# Patient Record
Sex: MALE | Race: WHITE | NOT HISPANIC OR LATINO | Employment: FULL TIME | URBAN - METROPOLITAN AREA
[De-identification: names, ages, dates, MRNs, and addresses within clinical notes are randomized per-mention and may not be internally consistent; named-entity substitution may affect disease eponyms.]

---

## 2022-02-21 ENCOUNTER — HISTORICAL (OUTPATIENT)
Dept: ADMINISTRATIVE | Facility: HOSPITAL | Age: 27
End: 2022-02-21

## 2022-02-21 LAB — POC CREATININE: 0.8 (ref 0.6–1.3)

## 2022-03-04 ENCOUNTER — HISTORICAL (OUTPATIENT)
Dept: ADMINISTRATIVE | Facility: HOSPITAL | Age: 27
End: 2022-03-04

## 2022-03-07 ENCOUNTER — HISTORICAL (OUTPATIENT)
Dept: CARDIOLOGY | Facility: HOSPITAL | Age: 27
End: 2022-03-07

## 2022-04-06 ENCOUNTER — HISTORICAL (OUTPATIENT)
Dept: ADMINISTRATIVE | Facility: HOSPITAL | Age: 27
End: 2022-04-06

## 2022-04-06 LAB
ABS NEUT (OLG): 2.2 (ref 2.1–9.2)
ALBUMIN SERPL-MCNC: 4.2 G/DL (ref 3.5–5)
ALBUMIN/GLOB SERPL: 1.4 {RATIO} (ref 1.1–2)
ALP SERPL-CCNC: 66 U/L (ref 40–150)
ALT SERPL-CCNC: 40 U/L (ref 0–55)
APPEARANCE, UA: CLEAR
APTT PPP: 27.7 S (ref 23.2–33.7)
AST SERPL-CCNC: 28 U/L (ref 5–34)
BACTERIA SPEC CULT: NORMAL
BASOPHILS # BLD AUTO: 0 10*3/UL (ref 0–0.2)
BASOPHILS NFR BLD AUTO: 1 %
BILIRUB SERPL-MCNC: 0.7 MG/DL
BILIRUB UR QL STRIP: NEGATIVE
BILIRUBIN DIRECT+TOT PNL SERPL-MCNC: 0.3 (ref 0–0.5)
BILIRUBIN DIRECT+TOT PNL SERPL-MCNC: 0.4 (ref 0–0.8)
BUN SERPL-MCNC: 28.4 MG/DL (ref 8.9–20.6)
CALCIUM SERPL-MCNC: 9.4 MG/DL (ref 8.7–10.5)
CHLORIDE SERPL-SCNC: 105 MMOL/L (ref 98–107)
CO2 SERPL-SCNC: 28 MMOL/L (ref 22–29)
COLOR UR: YELLOW
CREAT SERPL-MCNC: 0.97 MG/DL (ref 0.73–1.18)
EOSINOPHIL # BLD AUTO: 0.2 10*3/UL (ref 0–0.9)
EOSINOPHIL NFR BLD AUTO: 6 %
ERYTHROCYTE [DISTWIDTH] IN BLOOD BY AUTOMATED COUNT: 13.1 % (ref 11.5–17)
GLOBULIN SER-MCNC: 2.9 G/DL (ref 2.4–3.5)
GLUCOSE (UA): NEGATIVE
GLUCOSE SERPL-MCNC: 63 MG/DL (ref 74–100)
HCT VFR BLD AUTO: 45.4 % (ref 42–52)
HEMOLYSIS INTERF INDEX SERPL-ACNC: 2
HGB BLD-MCNC: 15 G/DL (ref 14–18)
HGB UR QL STRIP: NEGATIVE
ICTERIC INTERF INDEX SERPL-ACNC: 1
INR PPP: 1 (ref 0–1.3)
KETONES UR QL STRIP: NEGATIVE
LEUKOCYTE ESTERASE UR QL STRIP: NEGATIVE
LIPEMIC INTERF INDEX SERPL-ACNC: 2
LYMPHOCYTES # BLD AUTO: 1.6 10*3/UL (ref 0.6–4.6)
LYMPHOCYTES NFR BLD AUTO: 35 %
MANUAL DIFF? (OHS): NO
MCH RBC QN AUTO: 29.6 PG (ref 27–31)
MCHC RBC AUTO-ENTMCNC: 33 G/DL (ref 33–36)
MCV RBC AUTO: 89.5 FL (ref 80–94)
MONOCYTES # BLD AUTO: 0.4 10*3/UL (ref 0.1–1.3)
MONOCYTES NFR BLD AUTO: 8 %
NEUTROPHILS # BLD AUTO: 2.2 10*3/UL (ref 2.1–9.2)
NEUTROPHILS NFR BLD AUTO: 50 %
NITRITE UR QL STRIP: NEGATIVE
PH UR STRIP: 6.5 [PH] (ref 5–9)
PLATELET # BLD AUTO: 146 10*3/UL (ref 130–400)
PMV BLD AUTO: 10.2 FL (ref 9.4–12.4)
POTASSIUM SERPL-SCNC: 4.2 MMOL/L (ref 3.5–5.1)
PROT SERPL-MCNC: 7.1 G/DL (ref 6.4–8.3)
PROT UR QL STRIP: NEGATIVE
PROTHROMBIN TIME: 13.4 S (ref 12.5–14.5)
RBC # BLD AUTO: 5.07 10*6/UL (ref 4.7–6.1)
RBC #/AREA URNS HPF: NORMAL /[HPF] (ref 0–2)
SARS-COV-2 AG RESP QL IA.RAPID: NEGATIVE
SODIUM SERPL-SCNC: 141 MMOL/L (ref 136–145)
SP GR UR STRIP: 1.02 (ref 1–1.03)
SQUAMOUS EPITHELIAL, UA: NORMAL (ref 0–4)
UA WBC MAN: NORMAL (ref 0–2)
UROBILINOGEN UR STRIP-ACNC: 0.2
WBC # SPEC AUTO: 4.4 10*3/UL (ref 4.5–11.5)

## 2022-04-07 ENCOUNTER — HISTORICAL (OUTPATIENT)
Dept: ADMINISTRATIVE | Facility: HOSPITAL | Age: 27
End: 2022-04-07

## 2022-04-13 ENCOUNTER — HISTORICAL (OUTPATIENT)
Dept: ADMINISTRATIVE | Facility: HOSPITAL | Age: 27
End: 2022-04-13

## 2022-04-13 LAB
INR PPP: 1.9 (ref 0–1.3)
PROTHROMBIN TIME: 21.1 S (ref 12.5–14.5)

## 2022-04-28 ENCOUNTER — HISTORICAL (OUTPATIENT)
Dept: LAB | Facility: HOSPITAL | Age: 27
End: 2022-04-28
Payer: COMMERCIAL

## 2022-04-28 LAB
INR PPP: 1.8 (ref 2–3)
PROTHROMBIN TIME: 20.4 S (ref 11.7–14.5)

## 2022-05-03 ENCOUNTER — OFFICE VISIT (OUTPATIENT)
Dept: CARDIAC SURGERY | Facility: CLINIC | Age: 27
End: 2022-05-03
Payer: COMMERCIAL

## 2022-05-03 VITALS — SYSTOLIC BLOOD PRESSURE: 129 MMHG | DIASTOLIC BLOOD PRESSURE: 79 MMHG | HEART RATE: 91 BPM

## 2022-05-03 DIAGNOSIS — Z95.4 STATUS POST AORTIC VALVE REPLACEMENT WITH METALLIC VALVE: Primary | ICD-10-CM

## 2022-05-03 PROCEDURE — 99024 POSTOP FOLLOW-UP VISIT: CPT | Mod: ,,, | Performed by: SPECIALIST

## 2022-05-03 PROCEDURE — 1159F PR MEDICATION LIST DOCUMENTED IN MEDICAL RECORD: ICD-10-PCS | Mod: CPTII,,, | Performed by: SPECIALIST

## 2022-05-03 PROCEDURE — 3074F SYST BP LT 130 MM HG: CPT | Mod: CPTII,,, | Performed by: SPECIALIST

## 2022-05-03 PROCEDURE — 3078F PR MOST RECENT DIASTOLIC BLOOD PRESSURE < 80 MM HG: ICD-10-PCS | Mod: CPTII,,, | Performed by: SPECIALIST

## 2022-05-03 PROCEDURE — 1159F MED LIST DOCD IN RCRD: CPT | Mod: CPTII,,, | Performed by: SPECIALIST

## 2022-05-03 PROCEDURE — 1160F RVW MEDS BY RX/DR IN RCRD: CPT | Mod: CPTII,,, | Performed by: SPECIALIST

## 2022-05-03 PROCEDURE — 99024 PR POST-OP FOLLOW-UP VISIT: ICD-10-PCS | Mod: ,,, | Performed by: SPECIALIST

## 2022-05-03 PROCEDURE — 3074F PR MOST RECENT SYSTOLIC BLOOD PRESSURE < 130 MM HG: ICD-10-PCS | Mod: CPTII,,, | Performed by: SPECIALIST

## 2022-05-03 PROCEDURE — 3078F DIAST BP <80 MM HG: CPT | Mod: CPTII,,, | Performed by: SPECIALIST

## 2022-05-03 PROCEDURE — 1160F PR REVIEW ALL MEDS BY PRESCRIBER/CLIN PHARMACIST DOCUMENTED: ICD-10-PCS | Mod: CPTII,,, | Performed by: SPECIALIST

## 2022-05-03 RX ORDER — METHYLPREDNISOLONE 32 MG/1
TABLET ORAL
COMMUNITY
Start: 2022-02-16 | End: 2022-05-20

## 2022-05-03 RX ORDER — WARFARIN SODIUM 5 MG/1
5 TABLET ORAL
COMMUNITY
Start: 2022-04-11 | End: 2022-05-20

## 2022-05-03 RX ORDER — FAMOTIDINE 40 MG/1
TABLET, FILM COATED ORAL
COMMUNITY
Start: 2022-02-16

## 2022-05-03 RX ORDER — ENOXAPARIN SODIUM 100 MG/ML
INJECTION SUBCUTANEOUS
COMMUNITY
Start: 2022-04-11 | End: 2022-05-03

## 2022-05-03 RX ORDER — OXYCODONE AND ACETAMINOPHEN 5; 325 MG/1; MG/1
1 TABLET ORAL EVERY 4 HOURS PRN
COMMUNITY
Start: 2022-04-11

## 2022-05-03 RX ORDER — KETOROLAC TROMETHAMINE 10 MG/1
TABLET, FILM COATED ORAL
COMMUNITY
Start: 2022-04-11 | End: 2022-05-20

## 2022-05-03 NOTE — PROGRESS NOTES
Patient returns about a month following minimally invasive aortic valve replacement  He was doing very well with minimal pain until about a week ago he suddenly developed some right-sided chest wall fairly sharp pain.  He has little difficulty with deep breaths.  It is better today.  It responds well to his Toradol.  On physical examination his lungs are clear, his heart has a regular rate and rhythm without murmurs or gallops  His wounds have healed nicely.  There is a slight movement over his 2nd rib.  He has no swelling or edema and there is no murmur  His last INR is 1.8 and his medication has been adjusted by the [facility]  We will continue with minimal physical activity other than walking is not to do any lifting  I will see him back in 4 weeks just to be sure the chest wall is healing satisfactorily

## 2022-05-05 ENCOUNTER — LAB VISIT (OUTPATIENT)
Dept: LAB | Facility: HOSPITAL | Age: 27
End: 2022-05-05
Attending: INTERNAL MEDICINE
Payer: COMMERCIAL

## 2022-05-05 DIAGNOSIS — Z95.2 HEART VALVE REPLACED: Primary | ICD-10-CM

## 2022-05-05 LAB
INR BLD: 1.96 (ref 2–3)
PROTHROMBIN TIME: 21.8 SECONDS (ref 11.7–14.5)

## 2022-05-05 PROCEDURE — 36415 COLL VENOUS BLD VENIPUNCTURE: CPT

## 2022-05-05 PROCEDURE — 85610 PROTHROMBIN TIME: CPT

## 2022-05-12 ENCOUNTER — LAB VISIT (OUTPATIENT)
Dept: LAB | Facility: HOSPITAL | Age: 27
End: 2022-05-12
Attending: INTERNAL MEDICINE
Payer: COMMERCIAL

## 2022-05-12 ENCOUNTER — HOSPITAL ENCOUNTER (OUTPATIENT)
Dept: RADIOLOGY | Facility: HOSPITAL | Age: 27
Discharge: HOME | End: 2022-05-12
Attending: SPECIALIST
Payer: COMMERCIAL

## 2022-05-12 DIAGNOSIS — I47.9 PT (PAROXYSMAL TACHYCARDIA): Primary | ICD-10-CM

## 2022-05-12 DIAGNOSIS — R07.89 FEELING OF CHEST TIGHTNESS: Primary | ICD-10-CM

## 2022-05-12 DIAGNOSIS — R07.89 FEELING OF CHEST TIGHTNESS: ICD-10-CM

## 2022-05-12 LAB
INR BLD: 1.82 (ref 2–3)
PROTHROMBIN TIME: 20.6 SECONDS (ref 11.7–14.5)

## 2022-05-12 PROCEDURE — 36415 COLL VENOUS BLD VENIPUNCTURE: CPT

## 2022-05-12 PROCEDURE — 85610 PROTHROMBIN TIME: CPT

## 2022-05-12 PROCEDURE — 71046 X-RAY EXAM CHEST 2 VIEWS: CPT | Mod: TC

## 2022-05-13 RX ORDER — FUROSEMIDE 20 MG/1
20 TABLET ORAL 2 TIMES DAILY
Qty: 60 TABLET | Refills: 11 | Status: CANCELLED | OUTPATIENT
Start: 2022-05-13 | End: 2023-05-13

## 2022-05-13 RX ORDER — POTASSIUM CHLORIDE 750 MG/1
20 TABLET, EXTENDED RELEASE ORAL 2 TIMES DAILY
Qty: 20 TABLET | Refills: 0 | Status: CANCELLED | OUTPATIENT
Start: 2022-05-13 | End: 2022-05-18

## 2022-05-17 ENCOUNTER — TELEPHONE (OUTPATIENT)
Dept: CARDIAC SURGERY | Facility: CLINIC | Age: 27
End: 2022-05-17
Payer: COMMERCIAL

## 2022-05-17 DIAGNOSIS — R07.89 FEELING OF CHEST TIGHTNESS: Primary | ICD-10-CM

## 2022-05-18 ENCOUNTER — HOSPITAL ENCOUNTER (OUTPATIENT)
Dept: RADIOLOGY | Facility: HOSPITAL | Age: 27
Discharge: HOME | DRG: 314 | End: 2022-05-18
Attending: PHYSICIAN ASSISTANT
Payer: COMMERCIAL

## 2022-05-18 DIAGNOSIS — R07.89 FEELING OF CHEST TIGHTNESS: Primary | ICD-10-CM

## 2022-05-18 DIAGNOSIS — R07.89 FEELING OF CHEST TIGHTNESS: ICD-10-CM

## 2022-05-18 DIAGNOSIS — J90 PLEURAL EFFUSION, NOT ELSEWHERE CLASSIFIED: ICD-10-CM

## 2022-05-18 PROCEDURE — 71046 X-RAY EXAM CHEST 2 VIEWS: CPT | Mod: TC

## 2022-05-18 RX ORDER — AMOXICILLIN AND CLAVULANATE POTASSIUM 875; 125 MG/1; MG/1
1 TABLET, FILM COATED ORAL 2 TIMES DAILY
Qty: 10 TABLET | Refills: 0 | Status: ON HOLD | OUTPATIENT
Start: 2022-05-18 | End: 2022-05-23

## 2022-05-18 RX ORDER — AMOXICILLIN AND CLAVULANATE POTASSIUM 875; 125 MG/1; MG/1
1 TABLET, FILM COATED ORAL EVERY 12 HOURS
Qty: 10 TABLET | Refills: 0 | Status: CANCELLED | OUTPATIENT
Start: 2022-05-18 | End: 2022-05-23

## 2022-05-19 DIAGNOSIS — R07.89 FEELING OF CHEST TIGHTNESS: Primary | ICD-10-CM

## 2022-05-20 ENCOUNTER — HOSPITAL ENCOUNTER (INPATIENT)
Facility: HOSPITAL | Age: 27
LOS: 3 days | Discharge: HOME | DRG: 314 | End: 2022-05-23
Attending: STUDENT IN AN ORGANIZED HEALTH CARE EDUCATION/TRAINING PROGRAM | Admitting: SPECIALIST
Payer: COMMERCIAL

## 2022-05-20 DIAGNOSIS — R05.9 COUGH: ICD-10-CM

## 2022-05-20 DIAGNOSIS — I31.39 PERICARDIAL EFFUSION: ICD-10-CM

## 2022-05-20 DIAGNOSIS — U07.1 COVID-19: Primary | ICD-10-CM

## 2022-05-20 DIAGNOSIS — I31.4 CARDIAC TAMPONADE: ICD-10-CM

## 2022-05-20 DIAGNOSIS — I35.8 AORTIC VALVE ENDOCARDITIS: ICD-10-CM

## 2022-05-20 DIAGNOSIS — R06.00 DYSPNEA, UNSPECIFIED TYPE: ICD-10-CM

## 2022-05-20 DIAGNOSIS — R06.02 SOB (SHORTNESS OF BREATH): ICD-10-CM

## 2022-05-20 LAB
ALBUMIN SERPL-MCNC: 3.4 GM/DL (ref 3.5–5)
ALBUMIN/GLOB SERPL: 0.9 RATIO (ref 1.1–2)
ALP SERPL-CCNC: 52 UNIT/L (ref 40–150)
ALT SERPL-CCNC: 19 UNIT/L (ref 0–55)
APTT PPP: 55.8 SECONDS (ref 23.2–33.7)
AST SERPL-CCNC: 21 UNIT/L (ref 5–34)
AV INDEX (PROSTH): 0.34
AV MEAN GRADIENT: 15 MMHG
AV PEAK GRADIENT: 26 MMHG
AV VALVE AREA: 1.08 CM2
AV VELOCITY RATIO: 0.32
BASOPHILS # BLD AUTO: 0.04 X10(3)/MCL (ref 0–0.2)
BASOPHILS NFR BLD AUTO: 0.8 %
BILIRUBIN DIRECT+TOT PNL SERPL-MCNC: 0.4 MG/DL
BNP BLD-MCNC: 71.7 PG/ML
BSA FOR ECHO PROCEDURE: 1.91 M2
BUN SERPL-MCNC: 18.4 MG/DL (ref 8.9–20.6)
CALCIUM SERPL-MCNC: 9.4 MG/DL (ref 8.4–10.2)
CHLORIDE SERPL-SCNC: 105 MMOL/L (ref 98–107)
CO2 SERPL-SCNC: 29 MMOL/L (ref 22–29)
CREAT SERPL-MCNC: 0.83 MG/DL (ref 0.73–1.18)
CRP SERPL-MCNC: 63.3 MG/L
CV ECHO LV RWT: 0.82 CM
DOP CALC AO PEAK VEL: 2.53 M/S
DOP CALC AO VTI: 40 CM
DOP CALC LVOT AREA: 3.1 CM2
DOP CALC LVOT DIAMETER: 2 CM
DOP CALC LVOT PEAK VEL: 0.81 M/S
DOP CALC LVOT STROKE VOLUME: 43.02 CM3
DOP CALC MV VTI: 19 CM
DOP CALCLVOT PEAK VEL VTI: 13.7 CM
E/A RATIO: 1
E/E' RATIO: 6.42 M/S
ECHO LV POSTERIOR WALL: 1.69 CM (ref 0.6–1.1)
EJECTION FRACTION: 67 %
EOSINOPHIL # BLD AUTO: 0.65 X10(3)/MCL (ref 0–0.9)
EOSINOPHIL NFR BLD AUTO: 12.5 %
ERYTHROCYTE [DISTWIDTH] IN BLOOD BY AUTOMATED COUNT: 13.1 % (ref 11.5–17)
FLUAV AG UPPER RESP QL IA.RAPID: NOT DETECTED
FLUBV AG UPPER RESP QL IA.RAPID: NOT DETECTED
FRACTIONAL SHORTENING: 28 % (ref 28–44)
GLOBULIN SER-MCNC: 3.6 GM/DL (ref 2.4–3.5)
GLUCOSE SERPL-MCNC: 103 MG/DL (ref 74–100)
HCT VFR BLD AUTO: 33.5 % (ref 42–52)
HGB BLD-MCNC: 10.6 GM/DL (ref 14–18)
IMM GRANULOCYTES # BLD AUTO: 0.02 X10(3)/MCL (ref 0–0.02)
IMM GRANULOCYTES NFR BLD AUTO: 0.4 % (ref 0–0.43)
INR BLD: 2.5 (ref 0–1.3)
INTERVENTRICULAR SEPTUM: 1.49 CM (ref 0.6–1.1)
LEFT ATRIUM SIZE: 3.5 CM
LEFT ATRIUM VOLUME INDEX MOD: 18.4 ML/M2
LEFT ATRIUM VOLUME MOD: 34.6 CM3
LEFT INTERNAL DIMENSION IN SYSTOLE: 2.94 CM (ref 2.1–4)
LEFT VENTRICLE DIASTOLIC VOLUME INDEX: 39.48 ML/M2
LEFT VENTRICLE DIASTOLIC VOLUME: 74.22 ML
LEFT VENTRICLE MASS INDEX: 141 G/M2
LEFT VENTRICLE SYSTOLIC VOLUME INDEX: 17.7 ML/M2
LEFT VENTRICLE SYSTOLIC VOLUME: 33.31 ML
LEFT VENTRICULAR INTERNAL DIMENSION IN DIASTOLE: 4.1 CM (ref 3.5–6)
LEFT VENTRICULAR MASS: 264.27 G
LV LATERAL E/E' RATIO: 5.55 M/S
LV SEPTAL E/E' RATIO: 7.63 M/S
LVOT MG: 2 MMHG
LVOT MV: 0.57 CM/S
LYMPHOCYTES # BLD AUTO: 0.88 X10(3)/MCL (ref 0.6–4.6)
LYMPHOCYTES NFR BLD AUTO: 16.9 %
MCH RBC QN AUTO: 25.7 PG (ref 27–31)
MCHC RBC AUTO-ENTMCNC: 31.6 MG/DL (ref 33–36)
MCV RBC AUTO: 81.3 FL (ref 80–94)
MONOCYTES # BLD AUTO: 0.55 X10(3)/MCL (ref 0.1–1.3)
MONOCYTES NFR BLD AUTO: 10.6 %
MV MEAN GRADIENT: 2 MMHG
MV PEAK A VEL: 0.61 M/S
MV PEAK E VEL: 0.61 M/S
MV PEAK GRADIENT: 4 MMHG
MV VALVE AREA BY CONTINUITY EQUATION: 2.26 CM2
NEUTROPHILS # BLD AUTO: 3.1 X10(3)/MCL (ref 2.1–9.2)
NEUTROPHILS NFR BLD AUTO: 58.8 %
NRBC BLD AUTO-RTO: 0 %
PISA TR MAX VEL: 1.5 M/S
PLATELET # BLD AUTO: 296 X10(3)/MCL (ref 130–400)
PMV BLD AUTO: 10.3 FL (ref 9.4–12.4)
POTASSIUM SERPL-SCNC: 5 MMOL/L (ref 3.5–5.1)
PROT SERPL-MCNC: 7 GM/DL (ref 6.4–8.3)
PROTHROMBIN TIME: 26.5 SECONDS (ref 12.5–14.5)
PV PEAK VELOCITY: 1.39 CM/S
RBC # BLD AUTO: 4.12 X10(6)/MCL (ref 4.7–6.1)
RIGHT VENTRICULAR END-DIASTOLIC DIMENSION: 2.51 CM
SARS-COV-2 RNA RESP QL NAA+PROBE: DETECTED
SODIUM SERPL-SCNC: 139 MMOL/L (ref 136–145)
TDI LATERAL: 0.11 M/S
TDI SEPTAL: 0.08 M/S
TDI: 0.1 M/S
TR MAX PG: 9 MMHG
TRICUSPID ANNULAR PLANE SYSTOLIC EXCURSION: 2.07 CM
TROPONIN I SERPL-MCNC: <0.01 NG/ML (ref 0–0.04)
WBC # SPEC AUTO: 5.2 X10(3)/MCL (ref 4.5–11.5)

## 2022-05-20 PROCEDURE — 99024 POSTOP FOLLOW-UP VISIT: CPT | Mod: ,,, | Performed by: PHYSICIAN ASSISTANT

## 2022-05-20 PROCEDURE — 93010 EKG 12-LEAD: ICD-10-PCS | Mod: ,,, | Performed by: INTERNAL MEDICINE

## 2022-05-20 PROCEDURE — 25000003 PHARM REV CODE 250: Performed by: INTERNAL MEDICINE

## 2022-05-20 PROCEDURE — 85730 THROMBOPLASTIN TIME PARTIAL: CPT | Performed by: STUDENT IN AN ORGANIZED HEALTH CARE EDUCATION/TRAINING PROGRAM

## 2022-05-20 PROCEDURE — 25000003 PHARM REV CODE 250: Performed by: PHYSICIAN ASSISTANT

## 2022-05-20 PROCEDURE — 93010 ELECTROCARDIOGRAM REPORT: CPT | Mod: ,,, | Performed by: INTERNAL MEDICINE

## 2022-05-20 PROCEDURE — 27000207 HC ISOLATION

## 2022-05-20 PROCEDURE — 99285 EMERGENCY DEPT VISIT HI MDM: CPT | Mod: 25

## 2022-05-20 PROCEDURE — 84484 ASSAY OF TROPONIN QUANT: CPT | Performed by: STUDENT IN AN ORGANIZED HEALTH CARE EDUCATION/TRAINING PROGRAM

## 2022-05-20 PROCEDURE — 93005 ELECTROCARDIOGRAM TRACING: CPT

## 2022-05-20 PROCEDURE — 36415 COLL VENOUS BLD VENIPUNCTURE: CPT | Performed by: STUDENT IN AN ORGANIZED HEALTH CARE EDUCATION/TRAINING PROGRAM

## 2022-05-20 PROCEDURE — 85610 PROTHROMBIN TIME: CPT | Performed by: STUDENT IN AN ORGANIZED HEALTH CARE EDUCATION/TRAINING PROGRAM

## 2022-05-20 PROCEDURE — 83880 ASSAY OF NATRIURETIC PEPTIDE: CPT | Performed by: STUDENT IN AN ORGANIZED HEALTH CARE EDUCATION/TRAINING PROGRAM

## 2022-05-20 PROCEDURE — 80053 COMPREHEN METABOLIC PANEL: CPT | Performed by: STUDENT IN AN ORGANIZED HEALTH CARE EDUCATION/TRAINING PROGRAM

## 2022-05-20 PROCEDURE — 11000001 HC ACUTE MED/SURG PRIVATE ROOM

## 2022-05-20 PROCEDURE — 85025 COMPLETE CBC W/AUTO DIFF WBC: CPT | Performed by: STUDENT IN AN ORGANIZED HEALTH CARE EDUCATION/TRAINING PROGRAM

## 2022-05-20 PROCEDURE — 87636 SARSCOV2 & INF A&B AMP PRB: CPT | Performed by: EMERGENCY MEDICINE

## 2022-05-20 PROCEDURE — 86140 C-REACTIVE PROTEIN: CPT | Performed by: NURSE PRACTITIONER

## 2022-05-20 PROCEDURE — 99024 PR POST-OP FOLLOW-UP VISIT: ICD-10-PCS | Mod: ,,, | Performed by: PHYSICIAN ASSISTANT

## 2022-05-20 RX ORDER — ASPIRIN 325 MG
650 TABLET, DELAYED RELEASE (ENTERIC COATED) ORAL 3 TIMES DAILY
Status: DISCONTINUED | OUTPATIENT
Start: 2022-05-20 | End: 2022-05-23 | Stop reason: HOSPADM

## 2022-05-20 RX ORDER — BENZONATATE 100 MG/1
100 CAPSULE ORAL 3 TIMES DAILY PRN
Qty: 20 CAPSULE | Refills: 0 | Status: SHIPPED | OUTPATIENT
Start: 2022-05-20 | End: 2022-05-30

## 2022-05-20 RX ORDER — POTASSIUM CHLORIDE 20 MEQ/1
20 TABLET, EXTENDED RELEASE ORAL 2 TIMES DAILY
Status: DISCONTINUED | OUTPATIENT
Start: 2022-05-20 | End: 2022-05-20

## 2022-05-20 RX ORDER — METHYLPREDNISOLONE SOD SUCC 125 MG
125 VIAL (EA) INJECTION ONCE
Status: DISCONTINUED | OUTPATIENT
Start: 2022-05-20 | End: 2022-05-20

## 2022-05-20 RX ORDER — FAMOTIDINE 10 MG/ML
20 INJECTION INTRAVENOUS ONCE
Status: DISCONTINUED | OUTPATIENT
Start: 2022-05-20 | End: 2022-05-20

## 2022-05-20 RX ORDER — LORATADINE 10 MG/1
10 TABLET ORAL DAILY
Qty: 30 TABLET | Refills: 0 | Status: SHIPPED | OUTPATIENT
Start: 2022-05-20 | End: 2022-06-19

## 2022-05-20 RX ORDER — DIPHENHYDRAMINE HYDROCHLORIDE 50 MG/ML
25 INJECTION INTRAMUSCULAR; INTRAVENOUS ONCE
Status: DISCONTINUED | OUTPATIENT
Start: 2022-05-20 | End: 2022-05-20

## 2022-05-20 RX ORDER — METHYLPREDNISOLONE SOD SUCC 125 MG
VIAL (EA) INJECTION
Status: DISPENSED
Start: 2022-05-20 | End: 2022-05-21

## 2022-05-20 RX ORDER — FAMOTIDINE 20 MG/1
20 TABLET, FILM COATED ORAL DAILY
Status: DISCONTINUED | OUTPATIENT
Start: 2022-05-20 | End: 2022-05-20

## 2022-05-20 RX ORDER — FLUTICASONE PROPIONATE 50 MCG
1 SPRAY, SUSPENSION (ML) NASAL 2 TIMES DAILY PRN
Qty: 15 G | Refills: 0 | Status: SHIPPED | OUTPATIENT
Start: 2022-05-20

## 2022-05-20 RX ORDER — WARFARIN 10 MG/1
5 TABLET ORAL
COMMUNITY
Start: 2022-04-11 | End: 2022-05-20

## 2022-05-20 RX ORDER — OXYCODONE AND ACETAMINOPHEN 5; 325 MG/1; MG/1
1 TABLET ORAL EVERY 4 HOURS PRN
Status: DISCONTINUED | OUTPATIENT
Start: 2022-05-20 | End: 2022-05-23 | Stop reason: HOSPADM

## 2022-05-20 RX ORDER — COLCHICINE 0.6 MG/1
0.6 TABLET, FILM COATED ORAL 2 TIMES DAILY
Status: DISCONTINUED | OUTPATIENT
Start: 2022-05-20 | End: 2022-05-23 | Stop reason: HOSPADM

## 2022-05-20 RX ORDER — FAMOTIDINE 20 MG/1
20 TABLET, FILM COATED ORAL DAILY
Status: DISCONTINUED | OUTPATIENT
Start: 2022-05-21 | End: 2022-05-23 | Stop reason: HOSPADM

## 2022-05-20 RX ORDER — AMOXICILLIN AND CLAVULANATE POTASSIUM 875; 125 MG/1; MG/1
1 TABLET, FILM COATED ORAL 2 TIMES DAILY
Status: DISCONTINUED | OUTPATIENT
Start: 2022-05-20 | End: 2022-05-23 | Stop reason: HOSPADM

## 2022-05-20 RX ORDER — POTASSIUM CHLORIDE 20 MEQ/1
20 TABLET, EXTENDED RELEASE ORAL 2 TIMES DAILY
Status: ON HOLD | COMMUNITY
Start: 2022-05-13 | End: 2022-05-23 | Stop reason: HOSPADM

## 2022-05-20 RX ADMIN — ASPIRIN 650 MG: 325 TABLET, COATED ORAL at 03:05

## 2022-05-20 RX ADMIN — COLCHICINE 0.6 MG: 0.6 TABLET, FILM COATED ORAL at 08:05

## 2022-05-20 RX ADMIN — ASPIRIN 650 MG: 325 TABLET, COATED ORAL at 08:05

## 2022-05-20 RX ADMIN — AMOXICILLIN AND CLAVULANATE POTASSIUM 1 TABLET: 875; 125 TABLET, FILM COATED ORAL at 08:05

## 2022-05-20 RX ADMIN — AMOXICILLIN AND CLAVULANATE POTASSIUM 1 TABLET: 875; 125 TABLET, FILM COATED ORAL at 02:05

## 2022-05-20 NOTE — PROGRESS NOTES
No fever last night  Wbc normal  cxr improved  covid pos  Will get TTE      As above, temp down, although after abx's  COVID pos. Likely source of temp  If TTE shows no evidence of vegetation, and blood cultures neg., OK to discharge pt  Will cont. PO abx's for a week

## 2022-05-20 NOTE — ED NOTES
"Pt to ED with complaints of SOB, cough, fever x5 days; pt reports Hx of valve replacement, sees Dr Reece. Placed on ABx x3 days ago. Max temp at home 102.5 F, took tylenol last night. Pt reports had Chest XR, blood work done earlier in the week, given "fluid pills;" last dose Monday, prior to fever starting. Pt aaox4, nad, CRISTINA; pt and mother updated on plan of care, verbalizes understanding   "

## 2022-05-20 NOTE — CONSULTS
Consults   [facility] - Emergency Dept  Cardiology  Consult Note    Patient Name: Dani Pettit  MRN: [id number]  Admission Date: 5/20/2022  Hospital Length of Stay: 0 days  Code Status: No Order   Attending Provider: Fabricio Mustafa MD   Consulting Provider: DIPIKA Howell  Primary Care Physician: Maribel Mireles MD  Principal Problem:<principal problem not specified>    Patient information was obtained from patient and ER records.     Subjective:     Chief Complaint: Consultation Reason: Cardiac Tamponade     HPI:  Mr. Pettit is a 26 year old male, known to Dr. Hastings, who presented to the ED with reports of fever, and intermittent chest pain over the last week. Patient has history of bicuspid Aortic Valve and underwent MINI AVR with Mechanical valve on 4.7.22. He is on Warfarin Outpatient with INR noted to be within therapeutic range (2.5). Valve type is noted to be 22 mm Medtronic Mechanical Valve. Blood cultures were drawn and are negative for growth at 24 Hours. He was found to be COVID-19 positive, therefore, TTE was opted for over TOM. Transthoracic echocardiogram today (5.20.22) revealed large pericardial effusion with tamponade physiology. He has been evaluated by CTS (Dr. Reece) who is requesting pericardial draining of the effusion. [facility] is consulted for this reason.    PMH: Syncope/Collapse, VHD- Bicuspid Aortic Valve Requiring Mechanical AVR (April 2022)  PSH: Minimally Invasive Aortic Valve Replacement with #22 Medtronic Mechanical Valve (4.7.22), Wound reexploration Surgery Post Valve Replacement with Cautery of Pectoralis Muscular Bleeder)  Family History: No Significant Family History is Noted  Social History: Tobacco- Former Smoker, ETOH- Negative, Substance Abuse- Negative    Previous Cardiac Diagnostics:   Echocardiogram (5.20.22):  Large circumferential pericardial effusion with possible early tamponade.  The left ventricle is normal in size with mild concentric  hypertrophy and normal systolic function.  The estimated ejection fraction is 67%.  Well seated mechanical AV without significant stenosis or perivalvular leak. Peak AV 2.53 m/sec, MG 15 mmHg.  Normal left ventricular diastolic function.  Normal right ventricular size with normal right ventricular systolic function.     TOM (4.7.22):  Grade II Atherosclerotic Plaques in the Arch and Mid Thoracic Aorta.  GITA with no Thrombus and no Evidence of ASD.  Normal RV Size/Function.  No Significant MR or MS Noted.  Bicuspid Aortic Valve with a Folded and Distorted Anterior Leaflet.  LVH with EF 50% without Focal Segmental WMA.  No Pericardial Effusion is Noted.    Review of patient's allergies indicates:   Allergen Reactions    Egg derived     Shellfish containing products      Review of Systems:  Review of Systems   Constitutional: Positive for fatigue and fever.   Respiratory: Negative for shortness of breath.    Cardiovascular: Positive for chest pain.   All other systems reviewed and are negative.      Objective:     Vital Signs (Most Recent):  Temp: 97.7 °F (36.5 °C) (05/20/22 0543)  Pulse: 71 (05/20/22 0858)  Resp: 16 (05/20/22 0605)  BP: 139/82 (05/20/22 0858)  SpO2: 98 % (05/20/22 0858) Vital Signs (24h Range):  Temp:  [97.7 °F (36.5 °C)] 97.7 °F (36.5 °C)  Pulse:  [] 71  Resp:  [16-18] 16  SpO2:  [96 %-98 %] 98 %  BP: (117-143)/(65-82) 139/82     Weight: 77 kg (169 lb 12.1 oz)  Body mass index is 26.64 kg/m².    SpO2: 98 %  O2 Device (Oxygen Therapy): room air    No intake or output data in the 24 hours ending 05/20/22 1251    Lines/Drains/Airways     None               Significant Labs:  Recent Results (from the past 72 hour(s))   CBC with Differential    Collection Time: 05/18/22  7:52 AM   Result Value Ref Range    WBC 6.2 4.5 - 11.5 x10(3)/mcL    RBC 4.71 4.70 - 6.10 x10(6)/mcL    Hgb 12.1 (L) 14.0 - 18.0 gm/dL    Hct 38.7 (L) 42.0 - 52.0 %    MCV 82.2 80.0 - 94.0 fL    MCH 25.7 (L) 27.0 - 31.0 pg    MCHC  31.3 (L) 33.0 - 36.0 mg/dL    RDW 13.0 11.5 - 17.0 %    Platelet 330 130 - 400 x10(3)/mcL    MPV 10.1 9.4 - 12.4 fL    IG# 0.01 0 - 0.0155 x10(3)/mcL    IG% 0.2 0 - 0.43 %    NRBC% 0.0 %   Manual Differential    Collection Time: 05/18/22  7:52 AM   Result Value Ref Range    Neut Man 58 %    Lymph Man 12 %    Monocyte Man 16 %    Eos Man 11 %    Basophil Man 3 %    Band Neutrophil Man 0 %    [place] Man 0 %    Myelo Man 0 %    Promyelo Man 0 %    Blasts Man 0 %    Plasmacyte Man 0 %    Prolymph Man 0 %    Instr WBC 6 x10(3)/mcL    Abs Mono Man 0.96 0.1 - 1.3 x10(3)/mcL    Abs Eos Man 0.66 0 - 0.9 x10(3)/mcL    Abs Baso Man 0.18 0 - 0.2 x10(3)/mcL    Abs Lymp Man 0.72 (L) 0.6 - 4.6 x10(3)/mcL    Abs Neut 3.48 2.1 - 9.2 x10(3)/mcL    NRBC Man 0 %    Platelet Est Adequate Adequate    Polychrom 1+ (A) (none)    RBC Morph Abnormal (A) Normal    Anisocyte 1+ (A) (none)    Macrocyte 1+ (A) (none)    Acanthocytes 1+ (A) (none)    Stomatocytes 1+ (A) (none)   Basic Metabolic Panel    Collection Time: 05/18/22 11:50 AM   Result Value Ref Range    Sodium Level 138 136 - 145 mmol/L    Potassium Level 4.5 3.5 - 5.1 mmol/L    Chloride 102 98 - 107 mmol/L    Carbon Dioxide 28 22 - 29 mmol/L    Glucose Level 93 74 - 100 mg/dL    Blood Urea Nitrogen 18.1 8.9 - 20.6 mg/dL    Creatinine 0.87 0.73 - 1.18 mg/dL    BUN/Creatinine Ratio 21     Calcium Level Total 8.6 8.4 - 10.2 mg/dL    Anion Gap 8.0 mEq/L   Urine culture    Collection Time: 05/18/22 11:50 AM    Specimen: Urine, Clean Catch   Result Value Ref Range    Urine Culture No Growth At 24 Hours    Blood Culture [facility]    Collection Time: 05/18/22 11:50 AM    Specimen: Blood   Result Value Ref Range    CULTURE, BLOOD ([facility]) No Growth At 24 Hours    Protime-INR    Collection Time: 05/19/22  8:32 AM   Result Value Ref Range    PT 23.8 (H) 11.7 - 14.5 seconds    INR 2.19 2.00 - 3.00   COVID/FLU A&B PCR    Collection Time: 05/20/22  5:45 AM   Result Value Ref Range    Influenza A PCR Not  Detected Not Detected    Influenza B PCR Not Detected Not Detected    SARS-CoV-2 PCR Detected (A) Not Detected   Comprehensive metabolic panel    Collection Time: 05/20/22  6:32 AM   Result Value Ref Range    Sodium Level 139 136 - 145 mmol/L    Potassium Level 5.0 3.5 - 5.1 mmol/L    Chloride 105 98 - 107 mmol/L    Carbon Dioxide 29 22 - 29 mmol/L    Glucose Level 103 (H) 74 - 100 mg/dL    Blood Urea Nitrogen 18.4 8.9 - 20.6 mg/dL    Creatinine 0.83 0.73 - 1.18 mg/dL    Calcium Level Total 9.4 8.4 - 10.2 mg/dL    Protein Total 7.0 6.4 - 8.3 gm/dL    Albumin Level 3.4 (L) 3.5 - 5.0 gm/dL    Globulin 3.6 (H) 2.4 - 3.5 gm/dL    Albumin/Globulin Ratio 0.9 (L) 1.1 - 2.0 ratio    Bilirubin Total 0.4 <=1.5 mg/dL    Alkaline Phosphatase 52 40 - 150 unit/L    Alanine Aminotransferase 19 0 - 55 unit/L    Aspartate Aminotransferase 21 5 - 34 unit/L    Estimated GFR-Non  >60 mls/min/1.73/m2   APTT    Collection Time: 05/20/22  6:32 AM   Result Value Ref Range    PTT 55.8 (H) 23.2 - 33.7 seconds   Brain natriuretic peptide    Collection Time: 05/20/22  6:32 AM   Result Value Ref Range    Natriuretic Peptide 71.7 <=100.0 pg/mL   Troponin I    Collection Time: 05/20/22  6:32 AM   Result Value Ref Range    Troponin-I <0.010 0.000 - 0.045 ng/mL   CBC with Differential    Collection Time: 05/20/22  6:32 AM   Result Value Ref Range    WBC 5.2 4.5 - 11.5 x10(3)/mcL    RBC 4.12 (L) 4.70 - 6.10 x10(6)/mcL    Hgb 10.6 (L) 14.0 - 18.0 gm/dL    Hct 33.5 (L) 42.0 - 52.0 %    MCV 81.3 80.0 - 94.0 fL    MCH 25.7 (L) 27.0 - 31.0 pg    MCHC 31.6 (L) 33.0 - 36.0 mg/dL    RDW 13.1 11.5 - 17.0 %    Platelet 296 130 - 400 x10(3)/mcL    MPV 10.3 9.4 - 12.4 fL    Neut % 58.8 %    Lymph % 16.9 %    Mono % 10.6 %    Eos % 12.5 %    Basophil % 0.8 %    Lymph # 0.88 0.6 - 4.6 x10(3)/mcL    Neut # 3.1 2.1 - 9.2 x10(3)/mcL    Mono # 0.55 0.1 - 1.3 x10(3)/mcL    Eos # 0.65 0 - 0.9 x10(3)/mcL    Baso # 0.04 0 - 0.2 x10(3)/mcL    IG# 0.02 (H)  0 - 0.0155 x10(3)/mcL    IG% 0.4 0 - 0.43 %    NRBC% 0.0 %   Protime-INR    Collection Time: 05/20/22  6:32 AM   Result Value Ref Range    PT 26.5 (H) 12.5 - 14.5 seconds    INR 2.50 (H) 0.00 - 1.30   Echo    Collection Time: 05/20/22 12:36 PM   Result Value Ref Range    BSA 1.91 m2    TDI SEPTAL 0.08 m/s    LV LATERAL E/E' RATIO 5.55 m/s    LV SEPTAL E/E' RATIO 7.63 m/s    Left Ventricular Outflow Tract Mean Velocity 0.569 cm/s    Left Ventricular Outflow Tract Mean Gradient 2.00 mmHg    TDI LATERAL 0.11 m/s    PV PEAK VELOCITY 1.39 cm/s    LVIDd 4.10 3.5 - 6.0 cm    IVS 1.49 (A) 0.6 - 1.1 cm    Posterior Wall 1.69 (A) 0.6 - 1.1 cm    LVIDs 2.94 2.1 - 4.0 cm    FS 28 28 - 44 %    LV mass 264.27 g    LA size 3.50 cm    RVDD 2.51 cm    TAPSE 2.07 cm    Left Ventricle Relative Wall Thickness 0.82 cm    AV mean gradient 15 mmHg    AV valve area 1.08 cm2    AV Velocity Ratio 0.32     AV index (prosthetic) 0.34     MV mean gradient 2 mmHg    MV valve area by continuity eq 2.26 cm2    E/A ratio 1.00     Mean e' 0.10 m/s    LVOT diameter 2.00 cm    LVOT area 3.1 cm2    LVOT peak oren 0.81 m/s    LVOT peak VTI 13.70 cm    Ao peak oren 2.53 m/s    Ao VTI 40.0 cm    LVOT stroke volume 43.02 cm3    AV peak gradient 26 mmHg    MV peak gradient 4 mmHg    E/E' ratio 6.42 m/s    MV Peak E Oren 0.61 m/s    TR Max Oren 1.50 m/s    MV VTI 19.0 cm    MV Peak A Oren 0.61 m/s    LV Systolic Volume 33.31 mL    LV Systolic Volume Index 17.7 mL/m2    LV Diastolic Volume 74.22 mL    LV Diastolic Volume Index 39.48 mL/m2    LV Mass Index 141 g/m2    Triscuspid Valve Regurgitation Peak Gradient 9 mmHg    LA Volume Index (Mod) 18.4 mL/m2    LA volume (mod) 34.60 cm3       Significant Imaging: X-Ray: CXR: X-Ray Chest PA and Lateral (CXR): No results found for this visit on 05/20/22.  Imaging Results          X-Ray Chest AP Portable (Final result)  Result time 05/20/22 08:02:57    Final result by Hemanth Styles MD (05/20/22 08:02:57)                  Impression:      Similar small right pleural effusion versus right pleural scarring.  No acute pulmonary process appreciated.      Electronically signed by: Hemanth Jensen  Date:    05/20/2022  Time:    08:02             Narrative:    EXAMINATION:  XR CHEST AP PORTABLE    CLINICAL HISTORY:  Shortness of breath    TECHNIQUE:  Frontal view(s) of the chest.    COMPARISON:  Radiography 05/18/2022    FINDINGS:  Normal cardiac silhouette.  The lungs are well-inflated.  No consolidation identified.  Similar mild blunting of the right costophrenic angle.  No pneumothorax.                              Telemetry: Sinus Rhythm 96 BPM    EKG:    EKG:      Physical Exam  HENT:      Head: Normocephalic.   Cardiovascular:      Rate and Rhythm: Normal rate and regular rhythm.      Comments: Pericardial Friction Rub. Crisp Aortic Click [facility] at [place]  Pulmonary:      Effort: Pulmonary effort is normal.      Breath sounds: Normal breath sounds.   Abdominal:      General: Abdomen is flat.      Palpations: Abdomen is soft.   Musculoskeletal:      Cervical back: Neck supple.   Skin:     General: Skin is warm and dry.   Neurological:      General: No focal deficit present.      Mental Status: He is oriented to person, place, and time.   Psychiatric:         Mood and Affect: Mood normal.         Behavior: Behavior normal.       Home Medications:   No current facility-administered medications on file prior to encounter.     Current Outpatient Medications on File Prior to Encounter   Medication Sig Dispense Refill    amoxicillin-clavulanate 875-125mg (AUGMENTIN) 875-125 mg per tablet Take 1 tablet by mouth 2 (two) times a day. 10 tablet 0    potassium chloride SA (K-DUR,KLOR-CON) 20 MEQ tablet Take 20 mEq by mouth 2 (two) times daily.      warfarin (COUMADIN) 10 MG tablet Take 5 mg by mouth.      warfarin (COUMADIN) 5 MG tablet Take 5 mg by mouth.      famotidine (PEPCID) 40 MG tablet TAKE 1 TABLET BY MOUTH 30 MINUTES BEFORE SCAN       ketorolac (TORADOL) 10 mg tablet TAKE 1 TABLET BY MOUTH EVERY four HOURS AS NEEDED FOR PAIN. maximum of 4 tablets per day      methylPREDNISolone (MEDROL) 32 MG tablet TAKE 1 TABLET BY MOUTH AT 12 HOURS AND 2 HOURS PRIOR TO CT SCAN      oxyCODONE-acetaminophen (PERCOCET) 5-325 mg per tablet Take 1 tablet by mouth every 4 (four) hours as needed. for pain.         Current Inpatient Medications:  No current facility-administered medications for this encounter.    Current Outpatient Medications:     amoxicillin-clavulanate 875-125mg (AUGMENTIN) 875-125 mg per tablet, Take 1 tablet by mouth 2 (two) times a day., Disp: 10 tablet, Rfl: 0    potassium chloride SA (K-DUR,KLOR-CON) 20 MEQ tablet, Take 20 mEq by mouth 2 (two) times daily., Disp: , Rfl:     warfarin (COUMADIN) 10 MG tablet, Take 5 mg by mouth., Disp: , Rfl:     warfarin (COUMADIN) 5 MG tablet, Take 5 mg by mouth., Disp: , Rfl:     benzonatate (TESSALON) 100 MG capsule, Take 1 capsule (100 mg total) by mouth 3 (three) times daily as needed for Cough., Disp: 20 capsule, Rfl: 0    famotidine (PEPCID) 40 MG tablet, TAKE 1 TABLET BY MOUTH 30 MINUTES BEFORE SCAN, Disp: , Rfl:     fluticasone propionate (FLONASE) 50 mcg/actuation nasal spray, 1 spray (50 mcg total) by Each Nostril route 2 (two) times daily as needed for Rhinitis., Disp: 15 g, Rfl: 0    ketorolac (TORADOL) 10 mg tablet, TAKE 1 TABLET BY MOUTH EVERY four HOURS AS NEEDED FOR PAIN. maximum of 4 tablets per day, Disp: , Rfl:     loratadine (CLARITIN) 10 mg tablet, Take 1 tablet (10 mg total) by mouth once daily., Disp: 30 tablet, Rfl: 0    methylPREDNISolone (MEDROL) 32 MG tablet, TAKE 1 TABLET BY MOUTH AT 12 HOURS AND 2 HOURS PRIOR TO CT SCAN, Disp: , Rfl:     oxyCODONE-acetaminophen (PERCOCET) 5-325 mg per tablet, Take 1 tablet by mouth every 4 (four) hours as needed. for pain., Disp: , Rfl:          VTE Risk Mitigation (From admission, onward)    None          Assessment:   Large  "circumferential pericardial effusion with possible early tamponade Physiology    - Hemodynamically Stable at Current time  VHD- (Bicuspid Aortic Valve) S/P #22 Mechanical Medtronic Aortic Valve Replacement (4.7.22)    - On Warfarin Outpatient with Therapeutic INR (2.5)    - Well seated mechanical AV without significant stenosis or perivalvular leak. Peak AV 2.53 m/sec, MG 15 mmHg (Echo 5.20.22)    - EF 67%  Acute COVID-19 Infection (Asymptomatic)    - Blood Cultures Negative at 24 Hours  Small Right Pleural Effusion Versus Scarring on Chest Xray (5.20.22)  Anemia  Reported Shellfish Allergy Re: "Hives"    Plan:   CTS requested urgent pericardiocentesis in the setting of pericardial effusion with tamponade physciology.  Discussed with interventional cardiology (Dr. Bravo), who recommend deferring pericardiocentesis at this time given INR 2.5, non NPO Status, and current hemodynamic stability.  Hold Warfarin for now. Will monitor and consider percutaneous intervention versus Surgical Window when INR is within safe range  Check CRP.  Given fever, chest pain, + friction rub and pericardial effusion, I will treat for pericarditis s/p cardiac surgery.   Plan for colchicine 0.6 bid, with 650 ASA TID  Continuous Tele Monitoring while in Hospital  Will follow closely    Thank you for your consult.      Pb Tyson MD  Cardiology    "

## 2022-05-20 NOTE — ED PROVIDER NOTES
Encounter Date: 5/20/2022       History     Chief Complaint   Patient presents with    Fever    Shortness of Breath     Complaint of fever, productive cough, with SOB.  Symptoms started Monday.        Shortness of Breath  This is a new problem. The current episode started more than 2 days ago. The problem has been gradually improving. Associated symptoms include a fever and cough. Pertinent negatives include no sore throat, no chest pain, no abdominal pain and no rash. He has tried nothing for the symptoms. He has had prior hospitalizations. He has had no prior ED visits.        Patient is a 26-year-old male with past medical history of aortic valve stenosis status post aortic valve repair (Dr. Reece of cardiothoracic surgeon) presents to the emergency department for 1 week of shortness of breath, fever, productive cough.  No mitigating factors reported.  Patient had outpatient lab work, blood cultures obtained.  Patient state he had outpatient x-ray which showed fluid, he was started on diuretic.  He is scheduled for TOM later today.  No other symptoms reported.      Review of patient's allergies indicates:   Allergen Reactions    Egg derived     Shellfish containing products      Past Medical History:   Diagnosis Date    Stenosis of aortic and mitral valves      No past surgical history on file.  Family History   Family history unknown: Yes     Social History     Tobacco Use    Smoking status: Former Smoker    Smokeless tobacco: Never Used     Review of Systems   Constitutional: Positive for fever.   HENT: Negative for sore throat.    Eyes: Negative for visual disturbance.   Respiratory: Positive for cough and shortness of breath.    Cardiovascular: Negative for chest pain.   Gastrointestinal: Negative for abdominal pain.   Genitourinary: Negative for dysuria.   Musculoskeletal: Negative for joint swelling.   Skin: Negative for rash.   Neurological: Negative for weakness.   Psychiatric/Behavioral:  Negative for confusion.   All other systems reviewed and are negative.      Physical Exam     Initial Vitals [05/20/22 0543]   BP Pulse Resp Temp SpO2   (!) 140/71 98 18 97.7 °F (36.5 °C) 98 %      MAP       --         Physical Exam    Nursing note and vitals reviewed.  Constitutional: He appears well-developed and well-nourished. He is not diaphoretic. No distress.   HENT:   Head: Normocephalic and atraumatic.   Eyes: Conjunctivae and EOM are normal. Pupils are equal, round, and reactive to light.   Neck:   Normal range of motion.  Cardiovascular: Normal rate, regular rhythm and intact distal pulses.   Murmur (mechanical click) heard.  Pulmonary/Chest: Breath sounds normal. No respiratory distress. He has no wheezes. He has no rales.   Diminished BS R lung base.    Surgical incision sites chest wall, clean dry intact.  No surrounding redness or drainage.   Abdominal: Abdomen is soft. He exhibits no distension. There is no abdominal tenderness.   Musculoskeletal:         General: No tenderness or edema. Normal range of motion.      Cervical back: Normal range of motion.     Neurological: He is alert and oriented to person, place, and time. No cranial nerve deficit.   Skin: Skin is warm and dry. Capillary refill takes less than 2 seconds. No rash noted. No erythema.   Psychiatric: He has a normal mood and affect.         ED Course   Procedures  Labs Reviewed   COVID/FLU A&B PCR - Abnormal; Notable for the following components:       Result Value    SARS-CoV-2 PCR Detected (*)     All other components within normal limits   COMPREHENSIVE METABOLIC PANEL - Abnormal; Notable for the following components:    Glucose Level 103 (*)     Albumin Level 3.4 (*)     Globulin 3.6 (*)     Albumin/Globulin Ratio 0.9 (*)     All other components within normal limits   APTT - Abnormal; Notable for the following components:    PTT 55.8 (*)     All other components within normal limits   CBC WITH DIFFERENTIAL - Abnormal; Notable for  the following components:    RBC 4.12 (*)     Hgb 10.6 (*)     Hct 33.5 (*)     MCH 25.7 (*)     MCHC 31.6 (*)     IG# 0.02 (*)     All other components within normal limits   PROTIME-INR - Abnormal; Notable for the following components:    PT 26.5 (*)     INR 2.50 (*)     All other components within normal limits   B-TYPE NATRIURETIC PEPTIDE - Normal   TROPONIN I - Normal   CBC W/ AUTO DIFFERENTIAL    Narrative:     The following orders were created for panel order CBC auto differential.  Procedure                               Abnormality         Status                     ---------                               -----------         ------                     CBC with Differential[[id number]]        Abnormal            Final result                 Please view results for these tests on the individual orders.   URINALYSIS     EKG Readings: (Independently Interpreted)   Initial Reading: No STEMI.   Normal sinus rhythm.  Rate 98. Normal intervals.  No STEMI.     ECG Results          EKG 12-lead (In process)  Result time 05/20/22 06:35:13    In process by Interface, Lab In [facility] (05/20/22 06:35:13)                 Narrative:    Test Reason : R06.02,    Vent. Rate : 098 BPM     Atrial Rate : 098 BPM     P-R Int : 132 ms          QRS Dur : 094 ms      QT Int : 354 ms       P-R-T Axes : 055 048 042 degrees     QTc Int : 451 ms    Normal sinus rhythm  Nonspecific ST and T wave abnormality  Abnormal ECG  No previous ECGs available    Referred By: [email or web address]   SELF           Confirmed By:                   In process by Interface, Lab In [facility] (05/20/22 06:34:49)                 Narrative:    Test Reason : R06.02,    Vent. Rate : 098 BPM     Atrial Rate : 098 BPM     P-R Int : 132 ms          QRS Dur : 094 ms      QT Int : 354 ms       P-R-T Axes : 055 048 042 degrees     QTc Int : 451 ms    Normal sinus rhythm  Nonspecific ST and T wave abnormality  Abnormal ECG  No previous ECGs available    Referred By: [email or web address]   SELF            Confirmed By:                             Imaging Results          X-Ray Chest AP Portable (Final result)  Result time 05/20/22 08:02:57    Final result by Hemanth Styles MD (05/20/22 08:02:57)                 Impression:      Similar small right pleural effusion versus right pleural scarring.  No acute pulmonary process appreciated.      Electronically signed by: Hemanth Styles  Date:    05/20/2022  Time:    08:02             Narrative:    EXAMINATION:  XR CHEST AP PORTABLE    CLINICAL HISTORY:  Shortness of breath    TECHNIQUE:  Frontal view(s) of the chest.    COMPARISON:  Radiography 05/18/2022    FINDINGS:  Normal cardiac silhouette.  The lungs are well-inflated.  No consolidation identified.  Similar mild blunting of the right costophrenic angle.  No pneumothorax.                                 Medications - No data to display  Medical Decision Making:   Clinical Tests:   Lab Tests: Ordered and Reviewed  Radiological Study: Reviewed and Ordered  Medical Tests: Ordered and Reviewed    Patient is a 26-year-old male who presents to the ED for shortness of breath, cough, fever x3 days.  He is currently undergoing evaluation for possible etiologies of his shortness of breath and fever.  He had blood work, blood cultures, x-rays done outpatient.  Repeat lab work done today, no leukocytosis, swab positive for COVID-19.  Discussed with vascular surgery.  Discussed with Cardiology.  He has a rosa scheduled later today.  Currently placed in observation status.  All results discussed with patient and family.  He has no questions at time.  Patient remain NPO for any event he has possible procedure later today.  Patient and family verbalized understanding agreed to plan.             ED Course as of 05/27/22 2250   Fri May 20, 2022   0810 Discussed with Dr. Reece's team.  Patient scheduled for ROSA today.   [RP]   0818 Discussed with Gabe with [facility].  Will see patient at 10:00AM for possible ROSA. [RP]   5335  Discussed with Dr. Reece.  Will obtain TTE.  If nml, can DC.  [RP]      ED Course User Index  [RP] Fabricio Mustafa MD             Clinical Impression:   Final diagnoses:  [R06.02] SOB (shortness of breath)  [R06.00] Dyspnea, unspecified type (Primary)  [R05.9] Cough  [U07.1] COVID-19          ED Disposition Condition    Observation               Fabricio Mustafa MD  05/20/22 7484       Fabricio Mustafa MD  05/27/22 5789

## 2022-05-20 NOTE — ED PROVIDER NOTES
Encounter Date: 5/20/2022       History     Chief Complaint   Patient presents with    Fever    Shortness of Breath     Complaint of fever, productive cough, with SOB.  Symptoms started Monday.      HPI  Review of patient's allergies indicates:   Allergen Reactions    Egg derived     Shellfish containing products      Past Medical History:   Diagnosis Date    Stenosis of aortic and mitral valves      No past surgical history on file.  Family History   Family history unknown: Yes     Social History     Tobacco Use    Smoking status: Former Smoker    Smokeless tobacco: Never Used     Review of Systems    Physical Exam     Initial Vitals [05/20/22 0543]   BP Pulse Resp Temp SpO2   (!) 140/71 98 18 97.7 °F (36.5 °C) 98 %      MAP       --         Physical Exam    ED Course   Procedures  Labs Reviewed   COVID/FLU A&B PCR - Abnormal; Notable for the following components:       Result Value    SARS-CoV-2 PCR Detected (*)     All other components within normal limits   COMPREHENSIVE METABOLIC PANEL - Abnormal; Notable for the following components:    Glucose Level 103 (*)     Albumin Level 3.4 (*)     Globulin 3.6 (*)     Albumin/Globulin Ratio 0.9 (*)     All other components within normal limits   APTT - Abnormal; Notable for the following components:    PTT 55.8 (*)     All other components within normal limits   CBC WITH DIFFERENTIAL - Abnormal; Notable for the following components:    RBC 4.12 (*)     Hgb 10.6 (*)     Hct 33.5 (*)     MCH 25.7 (*)     MCHC 31.6 (*)     IG# 0.02 (*)     All other components within normal limits   PROTIME-INR - Abnormal; Notable for the following components:    PT 26.5 (*)     INR 2.50 (*)     All other components within normal limits   B-TYPE NATRIURETIC PEPTIDE - Normal   TROPONIN I - Normal   CBC W/ AUTO DIFFERENTIAL    Narrative:     The following orders were created for panel order CBC auto differential.  Procedure                               Abnormality         Status                      ---------                               -----------         ------                     CBC with Differential[[id number]]        Abnormal            Final result                 Please view results for these tests on the individual orders.   URINALYSIS        ECG Results          EKG 12-lead (In process)  Result time 05/20/22 06:35:13    In process by Interface, Lab In [facility] (05/20/22 06:35:13)                 Narrative:    Test Reason : R06.02,    Vent. Rate : 098 BPM     Atrial Rate : 098 BPM     P-R Int : 132 ms          QRS Dur : 094 ms      QT Int : 354 ms       P-R-T Axes : 055 048 042 degrees     QTc Int : 451 ms    Normal sinus rhythm  Nonspecific ST and T wave abnormality  Abnormal ECG  No previous ECGs available    Referred By: [email or web address]   SELF           Confirmed By:                   In process by Interface, Lab In [facility] (05/20/22 06:34:49)                 Narrative:    Test Reason : R06.02,    Vent. Rate : 098 BPM     Atrial Rate : 098 BPM     P-R Int : 132 ms          QRS Dur : 094 ms      QT Int : 354 ms       P-R-T Axes : 055 048 042 degrees     QTc Int : 451 ms    Normal sinus rhythm  Nonspecific ST and T wave abnormality  Abnormal ECG  No previous ECGs available    Referred By: [email or web address]   SELF           Confirmed By:                             Imaging Results          X-Ray Chest AP Portable (Final result)  Result time 05/20/22 08:02:57    Final result by Hemanth Styles MD (05/20/22 08:02:57)                 Impression:      Similar small right pleural effusion versus right pleural scarring.  No acute pulmonary process appreciated.      Electronically signed by: Hemanth Styles  Date:    05/20/2022  Time:    08:02             Narrative:    EXAMINATION:  XR CHEST AP PORTABLE    CLINICAL HISTORY:  Shortness of breath    TECHNIQUE:  Frontal view(s) of the chest.    COMPARISON:  Radiography 05/18/2022    FINDINGS:  Normal cardiac silhouette.  The lungs are well-inflated.  No  consolidation identified.  Similar mild blunting of the right costophrenic angle.  No pneumothorax.                                 Medications - No data to display  Medical Decision Making:   ED Management:  Dr. Reece informed of echo results - asked that [facility] could be paged to drain the fluid collection [facility] paged 124pm - RT with [facility] saw pt and will admit               ED Course as of 05/20/22 1323   Fri May 20, 2022   0810 Discussed with Dr. Reece's team.  Patient scheduled for TOM today.   [RP]   0818 Discussed with Gabe with [facility].  Will see patient at 10:00AM for possible TOM. [RP]   1042 Discussed with Dr. Reece.  Will obtain TTE.  If nml, can DC.  [RP]      ED Course User Index  [RP] Fabricio Mustafa MD             Clinical Impression:   Final diagnoses:  [R06.02] SOB (shortness of breath)  [R06.00] Dyspnea, unspecified type  [R05.9] Cough  [U07.1] COVID-19 (Primary)          ED Disposition Condition    Observation               Fernando Elliott MD  05/29/22 0258

## 2022-05-21 LAB
ANION GAP SERPL CALC-SCNC: 8 MEQ/L
BASOPHILS # BLD AUTO: 0.01 X10(3)/MCL (ref 0–0.2)
BASOPHILS NFR BLD AUTO: 0.1 %
BUN SERPL-MCNC: 19.2 MG/DL (ref 8.9–20.6)
CALCIUM SERPL-MCNC: 9.1 MG/DL (ref 8.4–10.2)
CHLORIDE SERPL-SCNC: 105 MMOL/L (ref 98–107)
CO2 SERPL-SCNC: 26 MMOL/L (ref 22–29)
CREAT SERPL-MCNC: 0.78 MG/DL (ref 0.73–1.18)
CREAT/UREA NIT SERPL: 25
EOSINOPHIL # BLD AUTO: 0 X10(3)/MCL (ref 0–0.9)
EOSINOPHIL NFR BLD AUTO: 0 %
ERYTHROCYTE [DISTWIDTH] IN BLOOD BY AUTOMATED COUNT: 13 % (ref 11.5–17)
GLUCOSE SERPL-MCNC: 179 MG/DL (ref 74–100)
HCT VFR BLD AUTO: 33.8 % (ref 42–52)
HGB BLD-MCNC: 10.5 GM/DL (ref 14–18)
IMM GRANULOCYTES # BLD AUTO: 0.03 X10(3)/MCL (ref 0–0.02)
IMM GRANULOCYTES NFR BLD AUTO: 0.3 % (ref 0–0.43)
INR BLD: 2.6 (ref 0–1.3)
LYMPHOCYTES # BLD AUTO: 0.72 X10(3)/MCL (ref 0.6–4.6)
LYMPHOCYTES NFR BLD AUTO: 8.2 %
MCH RBC QN AUTO: 25.5 PG (ref 27–31)
MCHC RBC AUTO-ENTMCNC: 31.1 MG/DL (ref 33–36)
MCV RBC AUTO: 82 FL (ref 80–94)
MONOCYTES # BLD AUTO: 0.64 X10(3)/MCL (ref 0.1–1.3)
MONOCYTES NFR BLD AUTO: 7.3 %
NEUTROPHILS # BLD AUTO: 7.4 X10(3)/MCL (ref 2.1–9.2)
NEUTROPHILS NFR BLD AUTO: 84.1 %
NRBC BLD AUTO-RTO: 0 %
PLATELET # BLD AUTO: 334 X10(3)/MCL (ref 130–400)
PMV BLD AUTO: 10.6 FL (ref 9.4–12.4)
POTASSIUM SERPL-SCNC: 4.7 MMOL/L (ref 3.5–5.1)
PROTHROMBIN TIME: 27.4 SECONDS (ref 12.5–14.5)
RBC # BLD AUTO: 4.12 X10(6)/MCL (ref 4.7–6.1)
SODIUM SERPL-SCNC: 139 MMOL/L (ref 136–145)
WBC # SPEC AUTO: 8.8 X10(3)/MCL (ref 4.5–11.5)

## 2022-05-21 PROCEDURE — 99024 PR POST-OP FOLLOW-UP VISIT: ICD-10-PCS | Mod: ,,, | Performed by: PHYSICIAN ASSISTANT

## 2022-05-21 PROCEDURE — 36415 COLL VENOUS BLD VENIPUNCTURE: CPT | Performed by: NURSE PRACTITIONER

## 2022-05-21 PROCEDURE — 11000001 HC ACUTE MED/SURG PRIVATE ROOM

## 2022-05-21 PROCEDURE — 94761 N-INVAS EAR/PLS OXIMETRY MLT: CPT

## 2022-05-21 PROCEDURE — 85610 PROTHROMBIN TIME: CPT | Performed by: NURSE PRACTITIONER

## 2022-05-21 PROCEDURE — 25000003 PHARM REV CODE 250: Performed by: PHYSICIAN ASSISTANT

## 2022-05-21 PROCEDURE — 80048 BASIC METABOLIC PNL TOTAL CA: CPT | Performed by: NURSE PRACTITIONER

## 2022-05-21 PROCEDURE — 25000003 PHARM REV CODE 250: Performed by: NURSE PRACTITIONER

## 2022-05-21 PROCEDURE — 85025 COMPLETE CBC W/AUTO DIFF WBC: CPT | Performed by: NURSE PRACTITIONER

## 2022-05-21 PROCEDURE — 27000207 HC ISOLATION

## 2022-05-21 PROCEDURE — 99024 POSTOP FOLLOW-UP VISIT: CPT | Mod: ,,, | Performed by: PHYSICIAN ASSISTANT

## 2022-05-21 PROCEDURE — 25000003 PHARM REV CODE 250: Performed by: INTERNAL MEDICINE

## 2022-05-21 RX ADMIN — AMOXICILLIN AND CLAVULANATE POTASSIUM 1 TABLET: 875; 125 TABLET, FILM COATED ORAL at 10:05

## 2022-05-21 RX ADMIN — FAMOTIDINE 20 MG: 20 TABLET, FILM COATED ORAL at 10:05

## 2022-05-21 RX ADMIN — ASPIRIN 650 MG: 325 TABLET, COATED ORAL at 03:05

## 2022-05-21 RX ADMIN — ASPIRIN 650 MG: 325 TABLET, COATED ORAL at 09:05

## 2022-05-21 RX ADMIN — AMOXICILLIN AND CLAVULANATE POTASSIUM 1 TABLET: 875; 125 TABLET, FILM COATED ORAL at 09:05

## 2022-05-21 RX ADMIN — COLCHICINE 0.6 MG: 0.6 TABLET, FILM COATED ORAL at 10:05

## 2022-05-21 RX ADMIN — ASPIRIN 650 MG: 325 TABLET, COATED ORAL at 10:05

## 2022-05-21 RX ADMIN — COLCHICINE 0.6 MG: 0.6 TABLET, FILM COATED ORAL at 09:05

## 2022-05-21 NOTE — PROGRESS NOTES
[facility]   Rounding Progress Note  Cardiothoracic Surgery    SUBJECTIVE:     Chief Complaint/Reason for Admission: fever    History of Present Illness:  Patient is a 26 y.o. male presents withfever  covid pos  Pericardial effusion      Family History of Heart Disease:no    No current facility-administered medications on file prior to encounter.     Current Outpatient Medications on File Prior to Encounter   Medication Sig Dispense Refill    amoxicillin-clavulanate 875-125mg (AUGMENTIN) 875-125 mg per tablet Take 1 tablet by mouth 2 (two) times a day. 10 tablet 0    potassium chloride SA (K-DUR,KLOR-CON) 20 MEQ tablet Take 20 mEq by mouth 2 (two) times daily.      [DISCONTINUED] warfarin (COUMADIN) 10 MG tablet Take 5 mg by mouth.      famotidine (PEPCID) 40 MG tablet TAKE 1 TABLET BY MOUTH 30 MINUTES BEFORE SCAN      oxyCODONE-acetaminophen (PERCOCET) 5-325 mg per tablet Take 1 tablet by mouth every 4 (four) hours as needed. for pain.          Review of patient's allergies indicates:   Allergen Reactions    Egg derived     Shellfish containing products         Past Medical History:   Diagnosis Date    Stenosis of aortic and mitral valves         No past surgical history on file.        Review of Systems:  Review of Systems   All other systems reviewed and are negative.       OBJECTIVE:        Physical Exam:  Physical Exam  Vitals reviewed.   HENT:      Head: Normocephalic.      Right Ear: Tympanic membrane normal.      Left Ear: Tympanic membrane normal.      Nose: Nose normal.      Mouth/Throat:      Mouth: Mucous membranes are moist.   Eyes:      Pupils: Pupils are equal, round, and reactive to light.   Cardiovascular:      Rate and Rhythm: Normal rate and regular rhythm.      Pulses: Normal pulses.   Pulmonary:      Effort: Pulmonary effort is normal.      Breath sounds: Normal breath sounds.   Abdominal:      Palpations: Abdomen is soft.   Musculoskeletal:         General: Normal range  of motion.      Cervical back: Normal range of motion.   Skin:     General: Skin is warm.   Neurological:      General: No focal deficit present.      Mental Status: He is alert.   Psychiatric:         Mood and Affect: Mood normal.          Laboratory:  I have reviewed all pertinent lab results within the past 24 hours.     INR 2.6    Diagnostic Results:  ECG: Reviewed          ASSESSMENT/PLAN:   A: pericardial effusion  P: await inr decline, poss perc drainage vs window poss monday

## 2022-05-21 NOTE — PROGRESS NOTES
Consults   [facility] - Emergency Dept  Cardiology  Consult Note    Patient Name: Dani Pettit  MRN: [id number]  Admission Date: 5/20/2022  Hospital Length of Stay: 1 days  Code Status: No Order   Attending Provider: Joni Reece IV, MD   Consulting Provider: DIPIKA Howell  Primary Care Physician: Maribel Mireles MD  Principal Problem:<principal problem not specified>    Patient information was obtained from patient and ER records.     Subjective:     Chief Complaint: Consultation Reason: Cardiac Tamponade     HPI:  Mr. Pettit is a 26 year old male, known to Dr. Hastings, who presented to the ED with reports of fever, and intermittent chest pain over the last week. Patient has history of bicuspid Aortic Valve and underwent MINI AVR with Mechanical valve on 4.7.22. He is on Warfarin Outpatient with INR noted to be within therapeutic range (2.5). Valve type is noted to be 22 mm Medtronic Mechanical Valve. Blood cultures were drawn and are negative for growth at 24 Hours. He was found to be COVID-19 positive, therefore, TTE was opted for over TOM. Transthoracic echocardiogram today (5.20.22) revealed large pericardial effusion with tamponade physiology. He has been evaluated by CTS (Dr. Reece) who is requesting pericardial draining of the effusion. CIS is consulted for this reason.    5.21.22: NAD Noted. Feeling fine. Denies CP/SOB. SR on Tele. BP Stable. INR 2.6. Warfarin on Hold.    PMH: Syncope/Collapse, VHD- Bicuspid Aortic Valve Requiring Mechanical AVR (April 2022)  PSH: Minimally Invasive Aortic Valve Replacement with #22 Medtronic Mechanical Valve (4.7.22), Wound reexploration Surgery Post Valve Replacement with Cautery of Pectoralis Muscular Bleeder)  Family History: No Significant Family History is Noted  Social History: Tobacco- Former Smoker, ETOH- Negative, Substance Abuse- Negative    Previous Cardiac Diagnostics:   Echocardiogram (5.20.22):  Large circumferential  pericardial effusion with possible early tamponade.  The left ventricle is normal in size with mild concentric hypertrophy and normal systolic function.  The estimated ejection fraction is 67%.  Well seated mechanical AV without significant stenosis or perivalvular leak. Peak AV 2.53 m/sec, MG 15 mmHg.  Normal left ventricular diastolic function.  Normal right ventricular size with normal right ventricular systolic function.     TOM (4.7.22):  Grade II Atherosclerotic Plaques in the Arch and Mid Thoracic Aorta.  GITA with no Thrombus and no Evidence of ASD.  Normal RV Size/Function.  No Significant MR or MS Noted.  Bicuspid Aortic Valve with a Folded and Distorted Anterior Leaflet.  LVH with EF 50% without Focal Segmental WMA.  No Pericardial Effusion is Noted.    Review of patient's allergies indicates:   Allergen Reactions    Egg derived     Shellfish containing products      Review of Systems:  Review of Systems   Constitutional: Negative for fatigue and fever.   Respiratory: Negative for shortness of breath.    Cardiovascular: Negative for chest pain.   All other systems reviewed and are negative.      Objective:     Vital Signs (Most Recent):  Temp: 97.9 °F (36.6 °C) (05/21/22 1114)  Pulse: 68 (05/21/22 1114)  Resp: (!) 21 (05/21/22 1114)  BP: 123/73 (05/21/22 1114)  SpO2: 98 % (05/21/22 1114) Vital Signs (24h Range):  Temp:  [97.6 °F (36.4 °C)-98.2 °F (36.8 °C)] 97.9 °F (36.6 °C)  Pulse:  [68-80] 68  Resp:  [19-21] 21  SpO2:  [93 %-98 %] 98 %  BP: (121-136)/(64-76) 123/73     Weight: 79.5 kg (175 lb 4.8 oz)  Body mass index is 27.51 kg/m².    SpO2: 98 %  O2 Device (Oxygen Therapy): room air    No intake or output data in the 24 hours ending 05/21/22 1117    Lines/Drains/Airways     Peripheral Intravenous Line  Duration                Peripheral IV - Single Lumen 05/20/22 1328 20 G Right Forearm <1 day              Significant Labs:  Recent Results (from the past 72 hour(s))   Basic Metabolic Panel     Collection Time: 05/18/22 11:50 AM   Result Value Ref Range    Sodium Level 138 136 - 145 mmol/L    Potassium Level 4.5 3.5 - 5.1 mmol/L    Chloride 102 98 - 107 mmol/L    Carbon Dioxide 28 22 - 29 mmol/L    Glucose Level 93 74 - 100 mg/dL    Blood Urea Nitrogen 18.1 8.9 - 20.6 mg/dL    Creatinine 0.87 0.73 - 1.18 mg/dL    BUN/Creatinine Ratio 21     Calcium Level Total 8.6 8.4 - 10.2 mg/dL    Anion Gap 8.0 mEq/L   Urine culture    Collection Time: 05/18/22 11:50 AM    Specimen: Urine, Clean Catch   Result Value Ref Range    Urine Culture No Growth    Blood Culture [facility]    Collection Time: 05/18/22 11:50 AM    Specimen: Blood   Result Value Ref Range    CULTURE, BLOOD ([facility]) No Growth At 48 Hours    Protime-INR    Collection Time: 05/19/22  8:32 AM   Result Value Ref Range    PT 23.8 (H) 11.7 - 14.5 seconds    INR 2.19 2.00 - 3.00   COVID/FLU A&B PCR    Collection Time: 05/20/22  5:45 AM   Result Value Ref Range    Influenza A PCR Not Detected Not Detected    Influenza B PCR Not Detected Not Detected    SARS-CoV-2 PCR Detected (A) Not Detected   Comprehensive metabolic panel    Collection Time: 05/20/22  6:32 AM   Result Value Ref Range    Sodium Level 139 136 - 145 mmol/L    Potassium Level 5.0 3.5 - 5.1 mmol/L    Chloride 105 98 - 107 mmol/L    Carbon Dioxide 29 22 - 29 mmol/L    Glucose Level 103 (H) 74 - 100 mg/dL    Blood Urea Nitrogen 18.4 8.9 - 20.6 mg/dL    Creatinine 0.83 0.73 - 1.18 mg/dL    Calcium Level Total 9.4 8.4 - 10.2 mg/dL    Protein Total 7.0 6.4 - 8.3 gm/dL    Albumin Level 3.4 (L) 3.5 - 5.0 gm/dL    Globulin 3.6 (H) 2.4 - 3.5 gm/dL    Albumin/Globulin Ratio 0.9 (L) 1.1 - 2.0 ratio    Bilirubin Total 0.4 <=1.5 mg/dL    Alkaline Phosphatase 52 40 - 150 unit/L    Alanine Aminotransferase 19 0 - 55 unit/L    Aspartate Aminotransferase 21 5 - 34 unit/L    Estimated GFR-Non  >60 mls/min/1.73/m2   APTT    Collection Time: 05/20/22  6:32 AM   Result Value Ref Range    PTT 55.8 (H) 23.2 -  33.7 seconds   Brain natriuretic peptide    Collection Time: 05/20/22  6:32 AM   Result Value Ref Range    Natriuretic Peptide 71.7 <=100.0 pg/mL   Troponin I    Collection Time: 05/20/22  6:32 AM   Result Value Ref Range    Troponin-I <0.010 0.000 - 0.045 ng/mL   CBC with Differential    Collection Time: 05/20/22  6:32 AM   Result Value Ref Range    WBC 5.2 4.5 - 11.5 x10(3)/mcL    RBC 4.12 (L) 4.70 - 6.10 x10(6)/mcL    Hgb 10.6 (L) 14.0 - 18.0 gm/dL    Hct 33.5 (L) 42.0 - 52.0 %    MCV 81.3 80.0 - 94.0 fL    MCH 25.7 (L) 27.0 - 31.0 pg    MCHC 31.6 (L) 33.0 - 36.0 mg/dL    RDW 13.1 11.5 - 17.0 %    Platelet 296 130 - 400 x10(3)/mcL    MPV 10.3 9.4 - 12.4 fL    Neut % 58.8 %    Lymph % 16.9 %    Mono % 10.6 %    Eos % 12.5 %    Basophil % 0.8 %    Lymph # 0.88 0.6 - 4.6 x10(3)/mcL    Neut # 3.1 2.1 - 9.2 x10(3)/mcL    Mono # 0.55 0.1 - 1.3 x10(3)/mcL    Eos # 0.65 0 - 0.9 x10(3)/mcL    Baso # 0.04 0 - 0.2 x10(3)/mcL    IG# 0.02 (H) 0 - 0.0155 x10(3)/mcL    IG% 0.4 0 - 0.43 %    NRBC% 0.0 %   Protime-INR    Collection Time: 05/20/22  6:32 AM   Result Value Ref Range    PT 26.5 (H) 12.5 - 14.5 seconds    INR 2.50 (H) 0.00 - 1.30   C-Reactive Protein    Collection Time: 05/20/22  6:32 AM   Result Value Ref Range    C-Reactive Protein 63.30 (H) <5.00 mg/L   Echo    Collection Time: 05/20/22 12:36 PM   Result Value Ref Range    BSA 1.91 m2    TDI SEPTAL 0.08 m/s    LV LATERAL E/E' RATIO 5.55 m/s    LV SEPTAL E/E' RATIO 7.63 m/s    Left Ventricular Outflow Tract Mean Velocity 0.569 cm/s    Left Ventricular Outflow Tract Mean Gradient 2.00 mmHg    TDI LATERAL 0.11 m/s    PV PEAK VELOCITY 1.39 cm/s    LVIDd 4.10 3.5 - 6.0 cm    IVS 1.49 (A) 0.6 - 1.1 cm    Posterior Wall 1.69 (A) 0.6 - 1.1 cm    LVIDs 2.94 2.1 - 4.0 cm    FS 28 28 - 44 %    LV mass 264.27 g    LA size 3.50 cm    RVDD 2.51 cm    TAPSE 2.07 cm    Left Ventricle Relative Wall Thickness 0.82 cm    AV mean gradient 15 mmHg    AV valve area 1.08 cm2    AV  Velocity Ratio 0.32     AV index (prosthetic) 0.34     MV mean gradient 2 mmHg    MV valve area by continuity eq 2.26 cm2    E/A ratio 1.00     Mean e' 0.10 m/s    LVOT diameter 2.00 cm    LVOT area 3.1 cm2    LVOT peak oren 0.81 m/s    LVOT peak VTI 13.70 cm    Ao peak oren 2.53 m/s    Ao VTI 40.0 cm    LVOT stroke volume 43.02 cm3    AV peak gradient 26 mmHg    MV peak gradient 4 mmHg    E/E' ratio 6.42 m/s    MV Peak E Oren 0.61 m/s    TR Max Oren 1.50 m/s    MV VTI 19.0 cm    MV Peak A Oren 0.61 m/s    LV Systolic Volume 33.31 mL    LV Systolic Volume Index 17.7 mL/m2    LV Diastolic Volume 74.22 mL    LV Diastolic Volume Index 39.48 mL/m2    LV Mass Index 141 g/m2    Triscuspid Valve Regurgitation Peak Gradient 9 mmHg    LA Volume Index (Mod) 18.4 mL/m2    LA volume (mod) 34.60 cm3    EF 67 %   Protime-INR    Collection Time: 05/21/22  3:53 AM   Result Value Ref Range    PT 27.4 (H) 12.5 - 14.5 seconds    INR 2.60 (H) 0.00 - 1.30   Basic Metabolic Panel    Collection Time: 05/21/22  3:53 AM   Result Value Ref Range    Sodium Level 139 136 - 145 mmol/L    Potassium Level 4.7 3.5 - 5.1 mmol/L    Chloride 105 98 - 107 mmol/L    Carbon Dioxide 26 22 - 29 mmol/L    Glucose Level 179 (H) 74 - 100 mg/dL    Blood Urea Nitrogen 19.2 8.9 - 20.6 mg/dL    Creatinine 0.78 0.73 - 1.18 mg/dL    BUN/Creatinine Ratio 25     Calcium Level Total 9.1 8.4 - 10.2 mg/dL    Estimated GFR-Non  >60 mls/min/1.73/m2    Anion Gap 8.0 mEq/L   CBC with Differential    Collection Time: 05/21/22  3:53 AM   Result Value Ref Range    WBC 8.8 4.5 - 11.5 x10(3)/mcL    RBC 4.12 (L) 4.70 - 6.10 x10(6)/mcL    Hgb 10.5 (L) 14.0 - 18.0 gm/dL    Hct 33.8 (L) 42.0 - 52.0 %    MCV 82.0 80.0 - 94.0 fL    MCH 25.5 (L) 27.0 - 31.0 pg    MCHC 31.1 (L) 33.0 - 36.0 mg/dL    RDW 13.0 11.5 - 17.0 %    Platelet 334 130 - 400 x10(3)/mcL    MPV 10.6 9.4 - 12.4 fL    Neut % 84.1 %    Lymph % 8.2 %    Mono % 7.3 %    Eos % 0.0 %    Basophil % 0.1 %    Lymph  # 0.72 0.6 - 4.6 x10(3)/mcL    Neut # 7.4 2.1 - 9.2 x10(3)/mcL    Mono # 0.64 0.1 - 1.3 x10(3)/mcL    Eos # 0.00 0 - 0.9 x10(3)/mcL    Baso # 0.01 0 - 0.2 x10(3)/mcL    IG# 0.03 (H) 0 - 0.0155 x10(3)/mcL    IG% 0.3 0 - 0.43 %    NRBC% 0.0 %       Significant Imaging: X-Ray: CXR: X-Ray Chest PA and Lateral (CXR): No results found for this visit on 05/20/22.  Imaging Results          X-Ray Chest AP Portable (Final result)  Result time 05/20/22 08:02:57    Final result by Hemanth Styles MD (05/20/22 08:02:57)                 Impression:      Similar small right pleural effusion versus right pleural scarring.  No acute pulmonary process appreciated.      Electronically signed by: Hemanth Styles  Date:    05/20/2022  Time:    08:02             Narrative:    EXAMINATION:  XR CHEST AP PORTABLE    CLINICAL HISTORY:  Shortness of breath    TECHNIQUE:  Frontal view(s) of the chest.    COMPARISON:  Radiography 05/18/2022    FINDINGS:  Normal cardiac silhouette.  The lungs are well-inflated.  No consolidation identified.  Similar mild blunting of the right costophrenic angle.  No pneumothorax.                              Telemetry: Sinus Rhythm 76 BPM    Physical Exam  HENT:      Head: Normocephalic.   Cardiovascular:      Rate and Rhythm: Normal rate and regular rhythm.      Comments: Pericardial Friction Rub. Crisp Aortic Click [facility]  Pulmonary:      Effort: Pulmonary effort is normal.      Breath sounds: Normal breath sounds.   Abdominal:      General: Abdomen is flat.      Palpations: Abdomen is soft.   Musculoskeletal:      Cervical back: Neck supple.   Skin:     General: Skin is warm and dry.   Neurological:      General: No focal deficit present.      Mental Status: He is oriented to person, place, and time.   Psychiatric:         Mood and Affect: Mood normal.         Behavior: Behavior normal.       Home Medications:   No current facility-administered medications on file prior to encounter.     Current  Outpatient Medications on File Prior to Encounter   Medication Sig Dispense Refill    amoxicillin-clavulanate 875-125mg (AUGMENTIN) 875-125 mg per tablet Take 1 tablet by mouth 2 (two) times a day. 10 tablet 0    potassium chloride SA (K-DUR,KLOR-CON) 20 MEQ tablet Take 20 mEq by mouth 2 (two) times daily.      [DISCONTINUED] warfarin (COUMADIN) 10 MG tablet Take 5 mg by mouth.      famotidine (PEPCID) 40 MG tablet TAKE 1 TABLET BY MOUTH 30 MINUTES BEFORE SCAN      oxyCODONE-acetaminophen (PERCOCET) 5-325 mg per tablet Take 1 tablet by mouth every 4 (four) hours as needed. for pain.         Current Inpatient Medications:    Current Facility-Administered Medications:     amoxicillin-clavulanate 875-125mg per tablet 1 tablet, 1 tablet, Oral, BID, BLAYNE Mi, 1 tablet at 05/21/22 1028    aspirin EC tablet 650 mg, 650 mg, Oral, TID, Snow Tyson MD, 650 mg at 05/21/22 1028    colchicine capsule/tablet 0.6 mg, 0.6 mg, Oral, BID, Snow Tyson MD, 0.6 mg at 05/21/22 1028    famotidine tablet 20 mg, 20 mg, Oral, Daily, DIPIKA Howell, 20 mg at 05/21/22 1027    oxyCODONE-acetaminophen 5-325 mg per tablet 1 tablet, 1 tablet, Oral, Q4H PRN, BLAYNE Mi         VTE Risk Mitigation (From admission, onward)    None          Assessment:   Large circumferential pericardial effusion with possible early tamponade Physiology    - Hemodynamically Stable at Current time, appears echolucent on my evaluation  Acute Pericarditis  VHD- (Bicuspid Aortic Valve) S/P #22 Mechanical Medtronic Aortic Valve Replacement (4.7.22)    - On Warfarin Outpatient with Therapeutic INR (2.5)    - Well seated mechanical AV without significant stenosis or perivalvular leak. Peak AV 2.53 m/sec, MG 15 mmHg (Echo 5.20.22)    - EF 67%  Acute COVID-19 Infection (Asymptomatic)    - Blood Cultures Negative at 24 Hours  Small Right Pleural Effusion Versus Scarring on Chest Xray (5.20.22)  Anemia  Reported  "Shellfish Allergy Re: "Hives"    Plan:   Warfarin on Hold  Continue Current Treatment for Pericarditis (Colchicine/ASA) will need a 3 month course  CTS Considering Window when INR is within Safe Range  Repeat Limited Echo on Monday Morning or for Hemodynamic Instability  Continue Tele Monitoring  Continue Pepcid for Gastric Protection    Pb Tyson MD  Cardiology    Review of Systems   Constitutional: Negative for fatigue and fever.   Cardiovascular: Negative for chest pain.   Respiratory: Negative for shortness of breath.    All other systems reviewed and are negative.      "

## 2022-05-22 LAB
INR BLD: 2.19 (ref 0–1.3)
PROTHROMBIN TIME: 23.9 SECONDS (ref 12.5–14.5)
SARS-COV-2 RDRP RESP QL NAA+PROBE: POSITIVE

## 2022-05-22 PROCEDURE — 85610 PROTHROMBIN TIME: CPT | Performed by: NURSE PRACTITIONER

## 2022-05-22 PROCEDURE — 11000001 HC ACUTE MED/SURG PRIVATE ROOM

## 2022-05-22 PROCEDURE — 25000003 PHARM REV CODE 250: Performed by: NURSE PRACTITIONER

## 2022-05-22 PROCEDURE — 36415 COLL VENOUS BLD VENIPUNCTURE: CPT | Performed by: NURSE PRACTITIONER

## 2022-05-22 PROCEDURE — 99024 PR POST-OP FOLLOW-UP VISIT: ICD-10-PCS | Mod: ,,, | Performed by: PHYSICIAN ASSISTANT

## 2022-05-22 PROCEDURE — 25000003 PHARM REV CODE 250: Performed by: INTERNAL MEDICINE

## 2022-05-22 PROCEDURE — 25000003 PHARM REV CODE 250: Performed by: PHYSICIAN ASSISTANT

## 2022-05-22 PROCEDURE — 94761 N-INVAS EAR/PLS OXIMETRY MLT: CPT

## 2022-05-22 PROCEDURE — 27000207 HC ISOLATION

## 2022-05-22 PROCEDURE — 99024 POSTOP FOLLOW-UP VISIT: CPT | Mod: ,,, | Performed by: PHYSICIAN ASSISTANT

## 2022-05-22 PROCEDURE — 87635 SARS-COV-2 COVID-19 AMP PRB: CPT | Performed by: PHYSICIAN ASSISTANT

## 2022-05-22 RX ADMIN — AMOXICILLIN AND CLAVULANATE POTASSIUM 1 TABLET: 875; 125 TABLET, FILM COATED ORAL at 09:05

## 2022-05-22 RX ADMIN — ASPIRIN 650 MG: 325 TABLET, COATED ORAL at 08:05

## 2022-05-22 RX ADMIN — ASPIRIN 650 MG: 325 TABLET, COATED ORAL at 09:05

## 2022-05-22 RX ADMIN — ASPIRIN 650 MG: 325 TABLET, COATED ORAL at 03:05

## 2022-05-22 RX ADMIN — COLCHICINE 0.6 MG: 0.6 TABLET, FILM COATED ORAL at 08:05

## 2022-05-22 RX ADMIN — AMOXICILLIN AND CLAVULANATE POTASSIUM 1 TABLET: 875; 125 TABLET, FILM COATED ORAL at 08:05

## 2022-05-22 RX ADMIN — COLCHICINE 0.6 MG: 0.6 TABLET, FILM COATED ORAL at 09:05

## 2022-05-22 RX ADMIN — FAMOTIDINE 20 MG: 20 TABLET, FILM COATED ORAL at 09:05

## 2022-05-22 NOTE — PROGRESS NOTES
[facility]   Rounding Progress Note  Cardiothoracic Surgery    SUBJECTIVE:     Chief Complaint/Reason for Admission:fever    History of Present Illness:  Patient is a 26 y.o. male presents with denies fever, feels good        Family History of Heart Disease: no    No current facility-administered medications on file prior to encounter.     Current Outpatient Medications on File Prior to Encounter   Medication Sig Dispense Refill    amoxicillin-clavulanate 875-125mg (AUGMENTIN) 875-125 mg per tablet Take 1 tablet by mouth 2 (two) times a day. 10 tablet 0    potassium chloride SA (K-DUR,KLOR-CON) 20 MEQ tablet Take 20 mEq by mouth 2 (two) times daily.      [DISCONTINUED] warfarin (COUMADIN) 10 MG tablet Take 5 mg by mouth.      famotidine (PEPCID) 40 MG tablet TAKE 1 TABLET BY MOUTH 30 MINUTES BEFORE SCAN      oxyCODONE-acetaminophen (PERCOCET) 5-325 mg per tablet Take 1 tablet by mouth every 4 (four) hours as needed. for pain.          Review of patient's allergies indicates:   Allergen Reactions    Egg derived     Shellfish containing products         Past Medical History:   Diagnosis Date    Stenosis of aortic and mitral valves         No past surgical history on file.        Review of Systems:  Review of Systems   All other systems reviewed and are negative.       OBJECTIVE:        Physical Exam:  Physical Exam  Vitals reviewed.   HENT:      Head: Normocephalic.      Right Ear: Tympanic membrane normal.      Left Ear: Tympanic membrane normal.      Nose: Nose normal.      Mouth/Throat:      Mouth: Mucous membranes are moist.   Eyes:      Pupils: Pupils are equal, round, and reactive to light.   Cardiovascular:      Rate and Rhythm: Normal rate and regular rhythm.      Pulses: Normal pulses.   Pulmonary:      Effort: Pulmonary effort is normal.      Breath sounds: Normal breath sounds.   Abdominal:      Palpations: Abdomen is soft.   Musculoskeletal:         General: Normal range of motion.       Cervical back: Normal range of motion.   Skin:     General: Skin is warm.   Neurological:      General: No focal deficit present.      Mental Status: He is alert.   Psychiatric:         Mood and Affect: Mood normal.          Laboratory:  I have reviewed all pertinent lab results within the past 24 hours.    Diagnostic Results:  ECG: Reviewed          ASSESSMENT/PLAN:   A: pericardial effusion  P: await INR, npo at midnight

## 2022-05-22 NOTE — PROGRESS NOTES
Consults   [facility] - Emergency Dept  Cardiology  Consult Note    Patient Name: Dani Pettit  MRN: [id number]  Admission Date: 5/20/2022  Hospital Length of Stay: 2 days  Code Status: No Order   Attending Provider: Joni Reece IV, MD   Consulting Provider: DIPIKA Howell  Primary Care Physician: Maribel Mireles MD  Principal Problem:<principal problem not specified>    Patient information was obtained from patient and ER records.     Subjective:     Chief Complaint: Consultation Reason: Cardiac Tamponade     HPI:  Mr. Pettit is a 26 year old male, known to Dr. Hastings, who presented to the ED with reports of fever, and intermittent chest pain over the last week. Patient has history of bicuspid Aortic Valve and underwent MINI AVR with Mechanical valve on 4.7.22. He is on Warfarin Outpatient with INR noted to be within therapeutic range (2.5). Valve type is noted to be 22 mm Medtronic Mechanical Valve. Blood cultures were drawn and are negative for growth at 24 Hours. He was found to be COVID-19 positive, therefore, TTE was opted for over TOM. Transthoracic echocardiogram today (5.20.22) revealed large pericardial effusion with tamponade physiology. He has been evaluated by CTS (Dr. Reece) who is requesting pericardial draining of the effusion. CIS is consulted for this reason.    5.21.22: NAD Noted. Feeling fine. Denies CP/SOB. SR on Tele. BP Stable. INR 2.6. Warfarin on Hold.  5.22.22: NAD Noted. Vitals stable.    PMH: Syncope/Collapse, VHD- Bicuspid Aortic Valve Requiring Mechanical AVR (April 2022)  PSH: Minimally Invasive Aortic Valve Replacement with #22 Medtronic Mechanical Valve (4.7.22), Wound reexploration Surgery Post Valve Replacement with Cautery of Pectoralis Muscular Bleeder)  Family History: No Significant Family History is Noted  Social History: Tobacco- Former Smoker, ETOH- Negative, Substance Abuse- Negative    Previous Cardiac Diagnostics:   Echocardiogram  (5.20.22):  Large circumferential pericardial effusion with possible early tamponade.  The left ventricle is normal in size with mild concentric hypertrophy and normal systolic function.  The estimated ejection fraction is 67%.  Well seated mechanical AV without significant stenosis or perivalvular leak. Peak AV 2.53 m/sec, MG 15 mmHg.  Normal left ventricular diastolic function.  Normal right ventricular size with normal right ventricular systolic function.     TOM (4.7.22):  Grade II Atherosclerotic Plaques in the Arch and Mid Thoracic Aorta.  GITA with no Thrombus and no Evidence of ASD.  Normal RV Size/Function.  No Significant MR or MS Noted.  Bicuspid Aortic Valve with a Folded and Distorted Anterior Leaflet.  LVH with EF 50% without Focal Segmental WMA.  No Pericardial Effusion is Noted.    Review of patient's allergies indicates:   Allergen Reactions    Egg derived     Shellfish containing products      Review of Systems:  Review of Systems   Constitutional: Negative for fatigue and fever.   Respiratory: Negative for shortness of breath.    Cardiovascular: Negative for chest pain.   All other systems reviewed and are negative.      Objective:     Vital Signs (Most Recent):  Temp: 97.8 °F (36.6 °C) (05/22/22 0749)  Pulse: 75 (05/22/22 0749)  Resp: 12 (05/22/22 0749)  BP: 133/76 (05/22/22 0749)  SpO2: 99 % (05/22/22 0749) Vital Signs (24h Range):  Temp:  [97.4 °F (36.3 °C)-97.9 °F (36.6 °C)] 97.8 °F (36.6 °C)  Pulse:  [60-75] 75  Resp:  [12-21] 12  SpO2:  [97 %-99 %] 99 %  BP: (122-142)/(64-76) 133/76     Weight: 79.5 kg (175 lb 4.8 oz)  Body mass index is 27.51 kg/m².    SpO2: 99 %  O2 Device (Oxygen Therapy): room air      Intake/Output Summary (Last 24 hours) at 5/22/2022 0917  Last data filed at 5/22/2022 0300  Gross per 24 hour   Intake 1140 ml   Output --   Net 1140 ml       Lines/Drains/Airways     Peripheral Intravenous Line  Duration                Peripheral IV - Single Lumen 05/20/22 1328 20 G  Right Forearm 1 day              Significant Labs:  Recent Results (from the past 72 hour(s))   COVID/FLU A&B PCR    Collection Time: 05/20/22  5:45 AM   Result Value Ref Range    Influenza A PCR Not Detected Not Detected    Influenza B PCR Not Detected Not Detected    SARS-CoV-2 PCR Detected (A) Not Detected   Comprehensive metabolic panel    Collection Time: 05/20/22  6:32 AM   Result Value Ref Range    Sodium Level 139 136 - 145 mmol/L    Potassium Level 5.0 3.5 - 5.1 mmol/L    Chloride 105 98 - 107 mmol/L    Carbon Dioxide 29 22 - 29 mmol/L    Glucose Level 103 (H) 74 - 100 mg/dL    Blood Urea Nitrogen 18.4 8.9 - 20.6 mg/dL    Creatinine 0.83 0.73 - 1.18 mg/dL    Calcium Level Total 9.4 8.4 - 10.2 mg/dL    Protein Total 7.0 6.4 - 8.3 gm/dL    Albumin Level 3.4 (L) 3.5 - 5.0 gm/dL    Globulin 3.6 (H) 2.4 - 3.5 gm/dL    Albumin/Globulin Ratio 0.9 (L) 1.1 - 2.0 ratio    Bilirubin Total 0.4 <=1.5 mg/dL    Alkaline Phosphatase 52 40 - 150 unit/L    Alanine Aminotransferase 19 0 - 55 unit/L    Aspartate Aminotransferase 21 5 - 34 unit/L    Estimated GFR-Non  >60 mls/min/1.73/m2   APTT    Collection Time: 05/20/22  6:32 AM   Result Value Ref Range    PTT 55.8 (H) 23.2 - 33.7 seconds   Brain natriuretic peptide    Collection Time: 05/20/22  6:32 AM   Result Value Ref Range    Natriuretic Peptide 71.7 <=100.0 pg/mL   Troponin I    Collection Time: 05/20/22  6:32 AM   Result Value Ref Range    Troponin-I <0.010 0.000 - 0.045 ng/mL   CBC with Differential    Collection Time: 05/20/22  6:32 AM   Result Value Ref Range    WBC 5.2 4.5 - 11.5 x10(3)/mcL    RBC 4.12 (L) 4.70 - 6.10 x10(6)/mcL    Hgb 10.6 (L) 14.0 - 18.0 gm/dL    Hct 33.5 (L) 42.0 - 52.0 %    MCV 81.3 80.0 - 94.0 fL    MCH 25.7 (L) 27.0 - 31.0 pg    MCHC 31.6 (L) 33.0 - 36.0 mg/dL    RDW 13.1 11.5 - 17.0 %    Platelet 296 130 - 400 x10(3)/mcL    MPV 10.3 9.4 - 12.4 fL    Neut % 58.8 %    Lymph % 16.9 %    Mono % 10.6 %    Eos % 12.5 %    Basophil  % 0.8 %    Lymph # 0.88 0.6 - 4.6 x10(3)/mcL    Neut # 3.1 2.1 - 9.2 x10(3)/mcL    Mono # 0.55 0.1 - 1.3 x10(3)/mcL    Eos # 0.65 0 - 0.9 x10(3)/mcL    Baso # 0.04 0 - 0.2 x10(3)/mcL    IG# 0.02 (H) 0 - 0.0155 x10(3)/mcL    IG% 0.4 0 - 0.43 %    NRBC% 0.0 %   Protime-INR    Collection Time: 05/20/22  6:32 AM   Result Value Ref Range    PT 26.5 (H) 12.5 - 14.5 seconds    INR 2.50 (H) 0.00 - 1.30   C-Reactive Protein    Collection Time: 05/20/22  6:32 AM   Result Value Ref Range    C-Reactive Protein 63.30 (H) <5.00 mg/L   Echo    Collection Time: 05/20/22 12:36 PM   Result Value Ref Range    BSA 1.91 m2    TDI SEPTAL 0.08 m/s    LV LATERAL E/E' RATIO 5.55 m/s    LV SEPTAL E/E' RATIO 7.63 m/s    Left Ventricular Outflow Tract Mean Velocity 0.569 cm/s    Left Ventricular Outflow Tract Mean Gradient 2.00 mmHg    TDI LATERAL 0.11 m/s    PV PEAK VELOCITY 1.39 cm/s    LVIDd 4.10 3.5 - 6.0 cm    IVS 1.49 (A) 0.6 - 1.1 cm    Posterior Wall 1.69 (A) 0.6 - 1.1 cm    LVIDs 2.94 2.1 - 4.0 cm    FS 28 28 - 44 %    LV mass 264.27 g    LA size 3.50 cm    RVDD 2.51 cm    TAPSE 2.07 cm    Left Ventricle Relative Wall Thickness 0.82 cm    AV mean gradient 15 mmHg    AV valve area 1.08 cm2    AV Velocity Ratio 0.32     AV index (prosthetic) 0.34     MV mean gradient 2 mmHg    MV valve area by continuity eq 2.26 cm2    E/A ratio 1.00     Mean e' 0.10 m/s    LVOT diameter 2.00 cm    LVOT area 3.1 cm2    LVOT peak oren 0.81 m/s    LVOT peak VTI 13.70 cm    Ao peak oren 2.53 m/s    Ao VTI 40.0 cm    LVOT stroke volume 43.02 cm3    AV peak gradient 26 mmHg    MV peak gradient 4 mmHg    E/E' ratio 6.42 m/s    MV Peak E Oren 0.61 m/s    TR Max Oren 1.50 m/s    MV VTI 19.0 cm    MV Peak A Oren 0.61 m/s    LV Systolic Volume 33.31 mL    LV Systolic Volume Index 17.7 mL/m2    LV Diastolic Volume 74.22 mL    LV Diastolic Volume Index 39.48 mL/m2    LV Mass Index 141 g/m2    Triscuspid Valve Regurgitation Peak Gradient 9 mmHg    LA Volume Index (Mod)  18.4 mL/m2    LA volume (mod) 34.60 cm3    EF 67 %   Protime-INR    Collection Time: 05/21/22  3:53 AM   Result Value Ref Range    PT 27.4 (H) 12.5 - 14.5 seconds    INR 2.60 (H) 0.00 - 1.30   Basic Metabolic Panel    Collection Time: 05/21/22  3:53 AM   Result Value Ref Range    Sodium Level 139 136 - 145 mmol/L    Potassium Level 4.7 3.5 - 5.1 mmol/L    Chloride 105 98 - 107 mmol/L    Carbon Dioxide 26 22 - 29 mmol/L    Glucose Level 179 (H) 74 - 100 mg/dL    Blood Urea Nitrogen 19.2 8.9 - 20.6 mg/dL    Creatinine 0.78 0.73 - 1.18 mg/dL    BUN/Creatinine Ratio 25     Calcium Level Total 9.1 8.4 - 10.2 mg/dL    Estimated GFR-Non  >60 mls/min/1.73/m2    Anion Gap 8.0 mEq/L   CBC with Differential    Collection Time: 05/21/22  3:53 AM   Result Value Ref Range    WBC 8.8 4.5 - 11.5 x10(3)/mcL    RBC 4.12 (L) 4.70 - 6.10 x10(6)/mcL    Hgb 10.5 (L) 14.0 - 18.0 gm/dL    Hct 33.8 (L) 42.0 - 52.0 %    MCV 82.0 80.0 - 94.0 fL    MCH 25.5 (L) 27.0 - 31.0 pg    MCHC 31.1 (L) 33.0 - 36.0 mg/dL    RDW 13.0 11.5 - 17.0 %    Platelet 334 130 - 400 x10(3)/mcL    MPV 10.6 9.4 - 12.4 fL    Neut % 84.1 %    Lymph % 8.2 %    Mono % 7.3 %    Eos % 0.0 %    Basophil % 0.1 %    Lymph # 0.72 0.6 - 4.6 x10(3)/mcL    Neut # 7.4 2.1 - 9.2 x10(3)/mcL    Mono # 0.64 0.1 - 1.3 x10(3)/mcL    Eos # 0.00 0 - 0.9 x10(3)/mcL    Baso # 0.01 0 - 0.2 x10(3)/mcL    IG# 0.03 (H) 0 - 0.0155 x10(3)/mcL    IG% 0.3 0 - 0.43 %    NRBC% 0.0 %   Protime-INR    Collection Time: 05/22/22  8:27 AM   Result Value Ref Range    PT 23.9 (H) 12.5 - 14.5 seconds    INR 2.19 (H) 0.00 - 1.30       Significant Imaging: X-Ray: CXR: X-Ray Chest PA and Lateral (CXR): No results found for this visit on 05/20/22.  Imaging Results          X-Ray Chest AP Portable (Final result)  Result time 05/20/22 08:02:57    Final result by Hemanth Styles MD (05/20/22 08:02:57)                 Impression:      Similar small right pleural effusion versus right pleural  scarring.  No acute pulmonary process appreciated.      Electronically signed by: Hemanth Styles  Date:    05/20/2022  Time:    08:02             Narrative:    EXAMINATION:  XR CHEST AP PORTABLE    CLINICAL HISTORY:  Shortness of breath    TECHNIQUE:  Frontal view(s) of the chest.    COMPARISON:  Radiography 05/18/2022    FINDINGS:  Normal cardiac silhouette.  The lungs are well-inflated.  No consolidation identified.  Similar mild blunting of the right costophrenic angle.  No pneumothorax.                              Telemetry: Sinus Rhythm 78 BPM    Physical Exam  HENT:      Head: Normocephalic.   Cardiovascular:      Rate and Rhythm: Normal rate and regular rhythm.      Comments: Pericardial Friction Rub. Crisp Aortic Click [place] at [place]  Pulmonary:      Effort: Pulmonary effort is normal.      Breath sounds: Normal breath sounds.   Abdominal:      General: Abdomen is flat.      Palpations: Abdomen is soft.   Musculoskeletal:      Cervical back: Neck supple.   Skin:     General: Skin is warm and dry.   Neurological:      General: No focal deficit present.      Mental Status: He is oriented to person, place, and time.   Psychiatric:         Mood and Affect: Mood normal.         Behavior: Behavior normal.       Home Medications:   No current facility-administered medications on file prior to encounter.     Current Outpatient Medications on File Prior to Encounter   Medication Sig Dispense Refill    amoxicillin-clavulanate 875-125mg (AUGMENTIN) 875-125 mg per tablet Take 1 tablet by mouth 2 (two) times a day. 10 tablet 0    potassium chloride SA (K-DUR,KLOR-CON) 20 MEQ tablet Take 20 mEq by mouth 2 (two) times daily.      [DISCONTINUED] warfarin (COUMADIN) 10 MG tablet Take 5 mg by mouth.      famotidine (PEPCID) 40 MG tablet TAKE 1 TABLET BY MOUTH 30 MINUTES BEFORE SCAN      oxyCODONE-acetaminophen (PERCOCET) 5-325 mg per tablet Take 1 tablet by mouth every 4 (four) hours as needed. for pain.         Current  "Inpatient Medications:    Current Facility-Administered Medications:     amoxicillin-clavulanate 875-125mg per tablet 1 tablet, 1 tablet, Oral, BID, BLAYNE Mi, 1 tablet at 05/21/22 2100    aspirin EC tablet 650 mg, 650 mg, Oral, TID, Snow Tyson MD, 650 mg at 05/21/22 2100    colchicine capsule/tablet 0.6 mg, 0.6 mg, Oral, BID, Snow Tyson MD, 0.6 mg at 05/21/22 2101    famotidine tablet 20 mg, 20 mg, Oral, Daily, Rein T Alejandra, FNP, 20 mg at 05/21/22 1027    oxyCODONE-acetaminophen 5-325 mg per tablet 1 tablet, 1 tablet, Oral, Q4H PRN, BLAYNE Mi         VTE Risk Mitigation (From admission, onward)    None          Assessment:   Large circumferential pericardial effusion with possible early tamponade Physiology    - Hemodynamically Stable at Current time, appears echolucent on my evaluation  Acute Pericarditis  VHD- (Bicuspid Aortic Valve) S/P #22 Mechanical Medtronic Aortic Valve Replacement (4.7.22)    - On Warfarin Outpatient with Therapeutic INR (2.5)    - Well seated mechanical AV without significant stenosis or perivalvular leak. Peak AV 2.53 m/sec, MG 15 mmHg (Echo 5.20.22)    - EF 67%  Acute COVID-19 Infection (Asymptomatic)    - Blood Cultures Negative at 24 Hours  Small Right Pleural Effusion Versus Scarring on Chest Xray (5.20.22)  Anemia  Reported Shellfish Allergy Re: "Hives"    Plan:   Warfarin on Hold  Continue Current Treatment for Pericarditis (Colchicine/ASA) will need a 3 month course  CTS Considering Window when INR is within Safe Range ? Tomorrow  Repeat Limited Echo Tomorrow Morning   Continue Tele Monitoring  Continue Pepcid for Gastric Protection    Pb Tyson MD  Cardiology    Review of Systems   Constitutional: Negative for fatigue and fever.   Cardiovascular: Negative for chest pain.   Respiratory: Negative for shortness of breath.    All other systems reviewed and are negative.      "

## 2022-05-23 VITALS
OXYGEN SATURATION: 98 % | TEMPERATURE: 98 F | SYSTOLIC BLOOD PRESSURE: 124 MMHG | HEIGHT: 67 IN | BODY MASS INDEX: 27.52 KG/M2 | WEIGHT: 175.31 LBS | RESPIRATION RATE: 18 BRPM | DIASTOLIC BLOOD PRESSURE: 68 MMHG | HEART RATE: 73 BPM

## 2022-05-23 PROBLEM — I31.4 CARDIAC TAMPONADE: Status: ACTIVE | Noted: 2022-05-23

## 2022-05-23 PROBLEM — I35.8 AORTIC VALVE ENDOCARDITIS: Status: ACTIVE | Noted: 2022-05-23

## 2022-05-23 PROBLEM — I31.39 PERICARDIAL EFFUSION: Status: ACTIVE | Noted: 2022-05-23

## 2022-05-23 LAB
ANION GAP SERPL CALC-SCNC: 10 MEQ/L
BASOPHILS # BLD AUTO: 0.03 X10(3)/MCL (ref 0–0.2)
BASOPHILS NFR BLD AUTO: 0.4 %
BSA FOR ECHO PROCEDURE: 1.91 M2
BUN SERPL-MCNC: 20.9 MG/DL (ref 8.9–20.6)
CALCIUM SERPL-MCNC: 9.3 MG/DL (ref 8.4–10.2)
CHLORIDE SERPL-SCNC: 105 MMOL/L (ref 98–107)
CO2 SERPL-SCNC: 23 MMOL/L (ref 22–29)
CREAT SERPL-MCNC: 0.83 MG/DL (ref 0.73–1.18)
CREAT/UREA NIT SERPL: 25
EOSINOPHIL # BLD AUTO: 0.39 X10(3)/MCL (ref 0–0.9)
EOSINOPHIL NFR BLD AUTO: 5.4 %
ERYTHROCYTE [DISTWIDTH] IN BLOOD BY AUTOMATED COUNT: 13.2 % (ref 11.5–17)
GLUCOSE SERPL-MCNC: 91 MG/DL (ref 74–100)
HCT VFR BLD AUTO: 34.6 % (ref 42–52)
HGB BLD-MCNC: 10.8 GM/DL (ref 14–18)
IMM GRANULOCYTES # BLD AUTO: 0.05 X10(3)/MCL (ref 0–0.02)
IMM GRANULOCYTES NFR BLD AUTO: 0.7 % (ref 0–0.43)
INR BLD: 1.56 (ref 0–1.3)
LYMPHOCYTES # BLD AUTO: 1.72 X10(3)/MCL (ref 0.6–4.6)
LYMPHOCYTES NFR BLD AUTO: 23.6 %
MCH RBC QN AUTO: 24.9 PG (ref 27–31)
MCHC RBC AUTO-ENTMCNC: 31.2 MG/DL (ref 33–36)
MCV RBC AUTO: 79.7 FL (ref 80–94)
MONOCYTES # BLD AUTO: 0.83 X10(3)/MCL (ref 0.1–1.3)
MONOCYTES NFR BLD AUTO: 11.4 %
NEUTROPHILS # BLD AUTO: 4.3 X10(3)/MCL (ref 2.1–9.2)
NEUTROPHILS NFR BLD AUTO: 58.5 %
NRBC BLD AUTO-RTO: 0 %
PLATELET # BLD AUTO: 279 X10(3)/MCL (ref 130–400)
PMV BLD AUTO: 9.5 FL (ref 9.4–12.4)
POTASSIUM SERPL-SCNC: 4.2 MMOL/L (ref 3.5–5.1)
PROTHROMBIN TIME: 18.3 SECONDS (ref 12.5–14.5)
RBC # BLD AUTO: 4.34 X10(6)/MCL (ref 4.7–6.1)
SODIUM SERPL-SCNC: 138 MMOL/L (ref 136–145)
WBC # SPEC AUTO: 7.3 X10(3)/MCL (ref 4.5–11.5)

## 2022-05-23 PROCEDURE — 85610 PROTHROMBIN TIME: CPT | Performed by: NURSE PRACTITIONER

## 2022-05-23 PROCEDURE — 85025 COMPLETE CBC W/AUTO DIFF WBC: CPT | Performed by: NURSE PRACTITIONER

## 2022-05-23 PROCEDURE — 99024 POSTOP FOLLOW-UP VISIT: CPT | Mod: ,,, | Performed by: PHYSICIAN ASSISTANT

## 2022-05-23 PROCEDURE — 99499 NO LOS: ICD-10-PCS | Mod: ,,, | Performed by: SPECIALIST

## 2022-05-23 PROCEDURE — 25000003 PHARM REV CODE 250: Performed by: NURSE PRACTITIONER

## 2022-05-23 PROCEDURE — 63600175 PHARM REV CODE 636 W HCPCS: Performed by: SPECIALIST

## 2022-05-23 PROCEDURE — 99024 PR POST-OP FOLLOW-UP VISIT: ICD-10-PCS | Mod: ,,, | Performed by: PHYSICIAN ASSISTANT

## 2022-05-23 PROCEDURE — 36415 COLL VENOUS BLD VENIPUNCTURE: CPT | Performed by: NURSE PRACTITIONER

## 2022-05-23 PROCEDURE — 25000003 PHARM REV CODE 250: Performed by: PHYSICIAN ASSISTANT

## 2022-05-23 PROCEDURE — 99499 UNLISTED E&M SERVICE: CPT | Mod: ,,, | Performed by: SPECIALIST

## 2022-05-23 PROCEDURE — 25000003 PHARM REV CODE 250: Performed by: INTERNAL MEDICINE

## 2022-05-23 PROCEDURE — 80048 BASIC METABOLIC PNL TOTAL CA: CPT | Performed by: NURSE PRACTITIONER

## 2022-05-23 RX ORDER — COLCHICINE 0.6 MG/1
0.6 TABLET ORAL 2 TIMES DAILY
Qty: 60 TABLET | Refills: 11 | Status: SHIPPED | OUTPATIENT
Start: 2022-05-23 | End: 2023-05-23

## 2022-05-23 RX ORDER — ENOXAPARIN SODIUM 100 MG/ML
60 INJECTION SUBCUTANEOUS 2 TIMES DAILY
Qty: 3.6 ML | Refills: 0 | Status: SHIPPED | OUTPATIENT
Start: 2022-05-23 | End: 2022-05-26

## 2022-05-23 RX ORDER — ASPIRIN 650 MG
650 TABLET, DELAYED RELEASE (ENTERIC COATED) ORAL 3 TIMES DAILY
Qty: 84 TABLET | Refills: 0 | Status: SHIPPED | OUTPATIENT
Start: 2022-05-23 | End: 2023-05-23

## 2022-05-23 RX ORDER — PANTOPRAZOLE SODIUM 40 MG/1
40 TABLET, DELAYED RELEASE ORAL DAILY
Qty: 30 TABLET | Refills: 11 | Status: SHIPPED | OUTPATIENT
Start: 2022-05-23 | End: 2023-05-23

## 2022-05-23 RX ORDER — ENOXAPARIN SODIUM 100 MG/ML
60 INJECTION SUBCUTANEOUS ONCE
Status: COMPLETED | OUTPATIENT
Start: 2022-05-23 | End: 2022-05-23

## 2022-05-23 RX ORDER — WARFARIN SODIUM 5 MG/1
5 TABLET ORAL DAILY
Qty: 30 TABLET | Refills: 0 | Status: SHIPPED | OUTPATIENT
Start: 2022-05-23

## 2022-05-23 RX ORDER — ENOXAPARIN SODIUM 100 MG/ML
60 INJECTION SUBCUTANEOUS
Status: DISCONTINUED | OUTPATIENT
Start: 2022-05-23 | End: 2022-05-23

## 2022-05-23 RX ADMIN — ENOXAPARIN SODIUM 60 MG: 100 INJECTION SUBCUTANEOUS at 02:05

## 2022-05-23 RX ADMIN — ASPIRIN 650 MG: 325 TABLET, COATED ORAL at 02:05

## 2022-05-23 RX ADMIN — AMOXICILLIN AND CLAVULANATE POTASSIUM 1 TABLET: 875; 125 TABLET, FILM COATED ORAL at 08:05

## 2022-05-23 RX ADMIN — FAMOTIDINE 20 MG: 20 TABLET, FILM COATED ORAL at 08:05

## 2022-05-23 RX ADMIN — ASPIRIN 650 MG: 325 TABLET, COATED ORAL at 08:05

## 2022-05-23 RX ADMIN — COLCHICINE 0.6 MG: 0.6 TABLET, FILM COATED ORAL at 08:05

## 2022-05-23 NOTE — DISCHARGE SUMMARY
[facility] [address] [place] [address]  Discharge Note  Short Stay    Procedure(s) (LRB):  Pericardiocentesis (N/A)    OUTCOME: Patient tolerated treatment/procedure well without complication and is now ready for discharge.    DISPOSITION: Home or Self Care    FINAL DIAGNOSIS:  Pericardial effusion    FOLLOWUP: In clinic    DISCHARGE INSTRUCTIONS:  No discharge procedures on file.      Clinical Reference Documents Added to Patient Instructions       Document    COUGH, ADULT ED (ENGLISH)    COVID-19 OVERVIEW (ENGLISH)    SHORTNESS OF BREATH (DYSPNEA) (ENGLISH)          TIME SPENT ON DISCHARGE: 15 minutes

## 2022-05-23 NOTE — PROGRESS NOTES
[facility] - Emergency Dept  Cardiology  Progress Note    Patient Name: Dani Pettit  MRN: [id number]  Admission Date: 5/20/2022  Hospital Length of Stay: 3 days  Code Status: No Order   Attending Provider: Joni Reece IV, MD   Consulting Provider: Alhaji Mccullough [facility]  Primary Care Physician: Maribel Mireles MD  Principal Problem:<principal problem not specified>    Patient information was obtained from patient and ER records.     Subjective:     Chief Complaint: Consultation Reason: Cardiac Tamponade     HPI:  Mr. Pettit is a 26 year old male, known to Dr. Hastings, who presented to the ED with reports of fever, and intermittent chest pain over the last week. Patient has history of bicuspid Aortic Valve and underwent MINI AVR with Mechanical valve on 4.7.22. He is on Warfarin Outpatient with INR noted to be within therapeutic range (2.5). Valve type is noted to be 22 mm Medtronic Mechanical Valve. Blood cultures were drawn and are negative for growth at 24 Hours. He was found to be COVID-19 positive, therefore, TTE was opted for over TOM. Transthoracic echocardiogram today (5.20.22) revealed large pericardial effusion with tamponade physiology. He has been evaluated by CTS (Dr. Reece) who is requesting pericardial draining of the effusion. CIS is consulted for this reason.    5.21.22: NAD Noted. Feeling fine. Denies CP/SOB. SR on Tele. BP Stable. INR 2.6. Warfarin on Hold.  5.22.22: NAD Noted. Vitals stable.  5.23.22: NAD noted. VSS. SR on tele. Echo reviewed. Case reviewed with interventionalist.    PMH: Syncope/Collapse, VHD- Bicuspid Aortic Valve Requiring Mechanical AVR (April 2022)  PSH: Minimally Invasive Aortic Valve Replacement with #22 Medtronic Mechanical Valve (4.7.22), Wound reexploration Surgery Post Valve Replacement with Cautery of Pectoralis Muscular Bleeder)  Family History: No Significant Family History is Noted  Social History: Tobacco- Former Smoker,  ETOH- Negative, Substance Abuse- Negative    Previous Cardiac Diagnostics:   Echocardiogram (5.20.22):  Large circumferential pericardial effusion with possible early tamponade.  The left ventricle is normal in size with mild concentric hypertrophy and normal systolic function.  The estimated ejection fraction is 67%.  Well seated mechanical AV without significant stenosis or perivalvular leak. Peak AV 2.53 m/sec, MG 15 mmHg.  Normal left ventricular diastolic function.  Normal right ventricular size with normal right ventricular systolic function.     TOM (4.7.22):  Grade II Atherosclerotic Plaques in the Arch and Mid Thoracic Aorta.  GITA with no Thrombus and no Evidence of ASD.  Normal RV Size/Function.  No Significant MR or MS Noted.  Bicuspid Aortic Valve with a Folded and Distorted Anterior Leaflet.  LVH with EF 50% without Focal Segmental WMA.  No Pericardial Effusion is Noted.    Review of patient's allergies indicates:   Allergen Reactions    Egg derived     Shellfish containing products      Review of Systems:  Review of Systems   Constitutional: Negative for fatigue and fever.   Respiratory: Negative for shortness of breath.    Cardiovascular: Negative for chest pain.   All other systems reviewed and are negative.      Objective:     Vital Signs (Most Recent):  Temp: 97.7 °F (36.5 °C) (05/23/22 0846)  Pulse: 73 (05/23/22 0846)  Resp: 18 (05/23/22 0400)  BP: 124/68 (05/23/22 0846)  SpO2: 98 % (05/23/22 0846) Vital Signs (24h Range):  Temp:  [97.2 °F (36.2 °C)-98.4 °F (36.9 °C)] 97.7 °F (36.5 °C)  Pulse:  [73-95] 73  Resp:  [14-18] 18  SpO2:  [97 %-99 %] 98 %  BP: (108-127)/(64-70) 124/68     Weight: 79.5 kg (175 lb 4.8 oz)  Body mass index is 27.51 kg/m².    SpO2: 98 %  O2 Device (Oxygen Therapy): room air      Intake/Output Summary (Last 24 hours) at 5/23/2022 0932  Last data filed at 5/23/2022 0600  Gross per 24 hour   Intake 900 ml   Output --   Net 900 ml       Lines/Drains/Airways       Peripheral  Intravenous Line  Duration                  Peripheral IV - Single Lumen 05/20/22 1328 20 G Right Forearm 2 days                  Significant Labs:  Recent Results (from the past 72 hour(s))   Echo    Collection Time: 05/20/22 12:36 PM   Result Value Ref Range    BSA 1.91 m2    TDI SEPTAL 0.08 m/s    LV LATERAL E/E' RATIO 5.55 m/s    LV SEPTAL E/E' RATIO 7.63 m/s    Left Ventricular Outflow Tract Mean Velocity 0.569 cm/s    Left Ventricular Outflow Tract Mean Gradient 2.00 mmHg    TDI LATERAL 0.11 m/s    PV PEAK VELOCITY 1.39 cm/s    LVIDd 4.10 3.5 - 6.0 cm    IVS 1.49 (A) 0.6 - 1.1 cm    Posterior Wall 1.69 (A) 0.6 - 1.1 cm    LVIDs 2.94 2.1 - 4.0 cm    FS 28 28 - 44 %    LV mass 264.27 g    LA size 3.50 cm    RVDD 2.51 cm    TAPSE 2.07 cm    Left Ventricle Relative Wall Thickness 0.82 cm    AV mean gradient 15 mmHg    AV valve area 1.08 cm2    AV Velocity Ratio 0.32     AV index (prosthetic) 0.34     MV mean gradient 2 mmHg    MV valve area by continuity eq 2.26 cm2    E/A ratio 1.00     Mean e' 0.10 m/s    LVOT diameter 2.00 cm    LVOT area 3.1 cm2    LVOT peak oren 0.81 m/s    LVOT peak VTI 13.70 cm    Ao peak oren 2.53 m/s    Ao VTI 40.0 cm    LVOT stroke volume 43.02 cm3    AV peak gradient 26 mmHg    MV peak gradient 4 mmHg    E/E' ratio 6.42 m/s    MV Peak E Oren 0.61 m/s    TR Max Oren 1.50 m/s    MV VTI 19.0 cm    MV Peak A Oren 0.61 m/s    LV Systolic Volume 33.31 mL    LV Systolic Volume Index 17.7 mL/m2    LV Diastolic Volume 74.22 mL    LV Diastolic Volume Index 39.48 mL/m2    LV Mass Index 141 g/m2    Triscuspid Valve Regurgitation Peak Gradient 9 mmHg    LA Volume Index (Mod) 18.4 mL/m2    LA volume (mod) 34.60 cm3    EF 67 %   Protime-INR    Collection Time: 05/21/22  3:53 AM   Result Value Ref Range    PT 27.4 (H) 12.5 - 14.5 seconds    INR 2.60 (H) 0.00 - 1.30   Basic Metabolic Panel    Collection Time: 05/21/22  3:53 AM   Result Value Ref Range    Sodium Level 139 136 - 145 mmol/L    Potassium Level  4.7 3.5 - 5.1 mmol/L    Chloride 105 98 - 107 mmol/L    Carbon Dioxide 26 22 - 29 mmol/L    Glucose Level 179 (H) 74 - 100 mg/dL    Blood Urea Nitrogen 19.2 8.9 - 20.6 mg/dL    Creatinine 0.78 0.73 - 1.18 mg/dL    BUN/Creatinine Ratio 25     Calcium Level Total 9.1 8.4 - 10.2 mg/dL    Estimated GFR-Non  >60 mls/min/1.73/m2    Anion Gap 8.0 mEq/L   CBC with Differential    Collection Time: 05/21/22  3:53 AM   Result Value Ref Range    WBC 8.8 4.5 - 11.5 x10(3)/mcL    RBC 4.12 (L) 4.70 - 6.10 x10(6)/mcL    Hgb 10.5 (L) 14.0 - 18.0 gm/dL    Hct 33.8 (L) 42.0 - 52.0 %    MCV 82.0 80.0 - 94.0 fL    MCH 25.5 (L) 27.0 - 31.0 pg    MCHC 31.1 (L) 33.0 - 36.0 mg/dL    RDW 13.0 11.5 - 17.0 %    Platelet 334 130 - 400 x10(3)/mcL    MPV 10.6 9.4 - 12.4 fL    Neut % 84.1 %    Lymph % 8.2 %    Mono % 7.3 %    Eos % 0.0 %    Basophil % 0.1 %    Lymph # 0.72 0.6 - 4.6 x10(3)/mcL    Neut # 7.4 2.1 - 9.2 x10(3)/mcL    Mono # 0.64 0.1 - 1.3 x10(3)/mcL    Eos # 0.00 0 - 0.9 x10(3)/mcL    Baso # 0.01 0 - 0.2 x10(3)/mcL    IG# 0.03 (H) 0 - 0.0155 x10(3)/mcL    IG% 0.3 0 - 0.43 %    NRBC% 0.0 %   Protime-INR    Collection Time: 05/22/22  8:27 AM   Result Value Ref Range    PT 23.9 (H) 12.5 - 14.5 seconds    INR 2.19 (H) 0.00 - 1.30   COVID-19 Rapid Screening    Collection Time: 05/22/22  9:55 AM   Result Value Ref Range    SARS COV-2 MOLECULAR Positive (A) Negative   Protime-INR    Collection Time: 05/23/22  4:35 AM   Result Value Ref Range    PT 18.3 (H) 12.5 - 14.5 seconds    INR 1.56 (H) 0.00 - 1.30   Basic Metabolic Panel    Collection Time: 05/23/22  4:35 AM   Result Value Ref Range    Sodium Level 138 136 - 145 mmol/L    Potassium Level 4.2 3.5 - 5.1 mmol/L    Chloride 105 98 - 107 mmol/L    Carbon Dioxide 23 22 - 29 mmol/L    Glucose Level 91 74 - 100 mg/dL    Blood Urea Nitrogen 20.9 (H) 8.9 - 20.6 mg/dL    Creatinine 0.83 0.73 - 1.18 mg/dL    BUN/Creatinine Ratio 25     Calcium Level Total 9.3 8.4 - 10.2 mg/dL     Estimated GFR-Non  >60 mls/min/1.73/m2    Anion Gap 10.0 mEq/L   CBC with Differential    Collection Time: 05/23/22  4:35 AM   Result Value Ref Range    WBC 7.3 4.5 - 11.5 x10(3)/mcL    RBC 4.34 (L) 4.70 - 6.10 x10(6)/mcL    Hgb 10.8 (L) 14.0 - 18.0 gm/dL    Hct 34.6 (L) 42.0 - 52.0 %    MCV 79.7 (L) 80.0 - 94.0 fL    MCH 24.9 (L) 27.0 - 31.0 pg    MCHC 31.2 (L) 33.0 - 36.0 mg/dL    RDW 13.2 11.5 - 17.0 %    Platelet 279 130 - 400 x10(3)/mcL    MPV 9.5 9.4 - 12.4 fL    Neut % 58.5 %    Lymph % 23.6 %    Mono % 11.4 %    Eos % 5.4 %    Basophil % 0.4 %    Lymph # 1.72 0.6 - 4.6 x10(3)/mcL    Neut # 4.3 2.1 - 9.2 x10(3)/mcL    Mono # 0.83 0.1 - 1.3 x10(3)/mcL    Eos # 0.39 0 - 0.9 x10(3)/mcL    Baso # 0.03 0 - 0.2 x10(3)/mcL    IG# 0.05 (H) 0 - 0.0155 x10(3)/mcL    IG% 0.7 (H) 0 - 0.43 %    NRBC% 0.0 %   Echo Saline Bubble? No    Collection Time: 05/23/22  7:58 AM   Result Value Ref Range    BSA 1.91 m2       Significant Imaging: X-Ray: CXR: X-Ray Chest PA and Lateral (CXR): No results found for this visit on 05/20/22.  Imaging Results              X-Ray Chest AP Portable (Final result)  Result time 05/20/22 08:02:57      Final result by Hemanth Styles MD (05/20/22 08:02:57)                   Impression:      Similar small right pleural effusion versus right pleural scarring.  No acute pulmonary process appreciated.      Electronically signed by: Hemanth Styles  Date:    05/20/2022  Time:    08:02               Narrative:    EXAMINATION:  XR CHEST AP PORTABLE    CLINICAL HISTORY:  Shortness of breath    TECHNIQUE:  Frontal view(s) of the chest.    COMPARISON:  Radiography 05/18/2022    FINDINGS:  Normal cardiac silhouette.  The lungs are well-inflated.  No consolidation identified.  Similar mild blunting of the right costophrenic angle.  No pneumothorax.                                    Telemetry: Sinus Rhythm 78 BPM    Physical Exam  HENT:      Head: Normocephalic.   Cardiovascular:      Rate and  Rhythm: Normal rate and regular rhythm.      Comments: Pericardial Friction Rub. Crisp Aortic [place] at [place]  Pulmonary:      Effort: Pulmonary effort is normal.      Breath sounds: Normal breath sounds.   Abdominal:      General: Abdomen is flat.      Palpations: Abdomen is soft.   Musculoskeletal:      Cervical back: Neck supple.   Skin:     General: Skin is warm and dry.   Neurological:      General: No focal deficit present.      Mental Status: He is oriented to person, place, and time.   Psychiatric:         Mood and Affect: Mood normal.         Behavior: Behavior normal.       Home Medications:   No current facility-administered medications on file prior to encounter.     Current Outpatient Medications on File Prior to Encounter   Medication Sig Dispense Refill    potassium chloride SA (K-DUR,KLOR-CON) 20 MEQ tablet Take 20 mEq by mouth 2 (two) times daily.      [DISCONTINUED] amoxicillin-clavulanate 875-125mg (AUGMENTIN) 875-125 mg per tablet Take 1 tablet by mouth 2 (two) times a day. 10 tablet 0    [DISCONTINUED] warfarin (COUMADIN) 10 MG tablet Take 5 mg by mouth.      famotidine (PEPCID) 40 MG tablet TAKE 1 TABLET BY MOUTH 30 MINUTES BEFORE SCAN      oxyCODONE-acetaminophen (PERCOCET) 5-325 mg per tablet Take 1 tablet by mouth every 4 (four) hours as needed. for pain.         Current Inpatient Medications:    Current Facility-Administered Medications:     amoxicillin-clavulanate 875-125mg per tablet 1 tablet, 1 tablet, Oral, BID, BLAYNE Mi, 1 tablet at 05/23/22 0851    aspirin EC tablet 650 mg, 650 mg, Oral, TID, Snow Tyson MD, 650 mg at 05/23/22 0851    colchicine capsule/tablet 0.6 mg, 0.6 mg, Oral, BID, Snow Tyson MD, 0.6 mg at 05/23/22 0851    famotidine tablet 20 mg, 20 mg, Oral, Daily, DIPIKA Howell, 20 mg at 05/23/22 0851    oxyCODONE-acetaminophen 5-325 mg per tablet 1 tablet, 1 tablet, Oral, Q4H PRN, BLAYNE Mi         VTE Risk  "Mitigation (From admission, onward)      None            Assessment:   Large circumferential pericardial effusion with possible early tamponade Physiology, now moderate without evidence for tamponade    - Hemodynamically Stable at Current time, appears echolucent on my evaluation  Acute Pericarditis  VHD- (Bicuspid Aortic Valve) S/P #22 Mechanical Medtronic Aortic Valve Replacement (4.7.22)    - On Warfarin Outpatient with Therapeutic INR (2.5)    - Well seated mechanical AV without significant stenosis or perivalvular leak. Peak AV 2.53 m/sec, MG 15 mmHg (Echo 5.20.22)    - EF 67%  Acute COVID-19 Infection (Asymptomatic)    - Blood Cultures Negative at 24 Hours  Small Right Pleural Effusion Versus Scarring on Chest Xray (5.20.22)  Anemia  Reported Shellfish Allergy Re: "Hives"    Plan:   Resume Warfarin   Continue Current Treatment for Pericarditis (Colchicine/ASA) will need a 3 month course  Continue Tele Monitoring  Continue PPI/ pepcid for Gastric Protection  Outpatient echo to follow up pericardial effusion in 2 weeks.  F/U with Dr. Hastings in 2weeks after echo completed.  Will sign off. Thank you for allowing us to participate in the care of this patient. Please call us with any questions.    Pb Tyson MD  Cardiology        "

## 2022-05-23 NOTE — NURSING
Patient discharged to home independently. IV and telemetry discontinued. Patient educated on medications and follow-up appointments. Medications delivered to room. Aspirin to be filled at another pharmacy of patients choice. Patient can return to work on 5/30/2022. Patient left floor ambulatory with surgical mask. Coumadin Clinic will contact patient to set up daily INR checks until therapeutic. Patient educated on enoxaparin administration route, dosage and timing of medication. CT ordered outpatient for 5/26/2022 can be ignored by patient. MD deems not necessary.

## 2022-05-23 NOTE — PLAN OF CARE
05/23/22 1410   Final Note   Assessment Type Final Discharge Note   Anticipated Discharge Disposition Home   Hospital Resources/Appts/Education Provided Post-Acute resouces added to AVS;Appointments scheduled and added to AVS  (referral sent to [facility])

## 2022-05-23 NOTE — PROGRESS NOTES
inr 1.56  Spoke with card , will have pericardiocentesis today  Full dose lovenox this evening after procedure

## 2022-05-25 ENCOUNTER — PATIENT OUTREACH (OUTPATIENT)
Dept: ADMINISTRATIVE | Facility: CLINIC | Age: 27
End: 2022-05-25
Payer: COMMERCIAL

## 2022-05-25 NOTE — PROGRESS NOTES
C3 nurse spoke with Dani Pettit for a TCC post hospital discharge follow up call. The patient has a scheduled HOSFU appointment with Dr Reece on 06/07/2022 @ 11:00AM.  Patient states also Dr Hastings on 06/11/2022 @ 01:15 pm    States PCP HOSFU with Dr Maribel Mireles  On 05/24/2022 @ 03:15pm

## 2022-05-26 ENCOUNTER — LAB VISIT (OUTPATIENT)
Dept: LAB | Facility: HOSPITAL | Age: 27
End: 2022-05-26
Attending: INTERNAL MEDICINE
Payer: COMMERCIAL

## 2022-05-26 DIAGNOSIS — Z95.2 HEART VALVE REPLACED BY TRANSPLANT: Primary | ICD-10-CM

## 2022-05-26 LAB
INR BLD: 1.59 (ref 2–3)
PROTHROMBIN TIME: 18.6 SECONDS (ref 11.7–14.5)

## 2022-05-26 PROCEDURE — 85610 PROTHROMBIN TIME: CPT

## 2022-05-26 PROCEDURE — 36415 COLL VENOUS BLD VENIPUNCTURE: CPT

## 2022-06-02 ENCOUNTER — LAB VISIT (OUTPATIENT)
Dept: LAB | Facility: HOSPITAL | Age: 27
End: 2022-06-02
Attending: INTERNAL MEDICINE
Payer: COMMERCIAL

## 2022-06-02 DIAGNOSIS — Z95.2 HEART VALVE REPLACED BY TRANSPLANT: Primary | ICD-10-CM

## 2022-06-02 LAB
INR BLD: 2.72 (ref 2–3)
PROTHROMBIN TIME: 28.1 SECONDS (ref 11.7–14.5)

## 2022-06-02 PROCEDURE — 36415 COLL VENOUS BLD VENIPUNCTURE: CPT

## 2022-06-02 PROCEDURE — 85610 PROTHROMBIN TIME: CPT

## 2022-06-07 ENCOUNTER — OFFICE VISIT (OUTPATIENT)
Dept: CARDIAC SURGERY | Facility: CLINIC | Age: 27
End: 2022-06-07
Payer: COMMERCIAL

## 2022-06-07 VITALS
DIASTOLIC BLOOD PRESSURE: 79 MMHG | HEIGHT: 67 IN | SYSTOLIC BLOOD PRESSURE: 119 MMHG | WEIGHT: 174 LBS | HEART RATE: 80 BPM | BODY MASS INDEX: 27.31 KG/M2

## 2022-06-07 DIAGNOSIS — Z95.4 STATUS POST AORTIC VALVE REPLACEMENT WITH METALLIC VALVE: Primary | ICD-10-CM

## 2022-06-07 PROCEDURE — 3008F PR BODY MASS INDEX (BMI) DOCUMENTED: ICD-10-PCS | Mod: CPTII,,, | Performed by: SPECIALIST

## 2022-06-07 PROCEDURE — 3078F PR MOST RECENT DIASTOLIC BLOOD PRESSURE < 80 MM HG: ICD-10-PCS | Mod: CPTII,,, | Performed by: SPECIALIST

## 2022-06-07 PROCEDURE — 99024 POSTOP FOLLOW-UP VISIT: CPT | Mod: ,,, | Performed by: SPECIALIST

## 2022-06-07 PROCEDURE — 1160F RVW MEDS BY RX/DR IN RCRD: CPT | Mod: CPTII,,, | Performed by: SPECIALIST

## 2022-06-07 PROCEDURE — 1159F MED LIST DOCD IN RCRD: CPT | Mod: CPTII,,, | Performed by: SPECIALIST

## 2022-06-07 PROCEDURE — 3074F SYST BP LT 130 MM HG: CPT | Mod: CPTII,,, | Performed by: SPECIALIST

## 2022-06-07 PROCEDURE — 3008F BODY MASS INDEX DOCD: CPT | Mod: CPTII,,, | Performed by: SPECIALIST

## 2022-06-07 PROCEDURE — 1111F DSCHRG MED/CURRENT MED MERGE: CPT | Mod: CPTII,,, | Performed by: SPECIALIST

## 2022-06-07 PROCEDURE — 1159F PR MEDICATION LIST DOCUMENTED IN MEDICAL RECORD: ICD-10-PCS | Mod: CPTII,,, | Performed by: SPECIALIST

## 2022-06-07 PROCEDURE — 99024 PR POST-OP FOLLOW-UP VISIT: ICD-10-PCS | Mod: ,,, | Performed by: SPECIALIST

## 2022-06-07 PROCEDURE — 1111F PR DISCHARGE MEDS RECONCILED W/ CURRENT OUTPATIENT MED LIST: ICD-10-PCS | Mod: CPTII,,, | Performed by: SPECIALIST

## 2022-06-07 PROCEDURE — 3074F PR MOST RECENT SYSTOLIC BLOOD PRESSURE < 130 MM HG: ICD-10-PCS | Mod: CPTII,,, | Performed by: SPECIALIST

## 2022-06-07 PROCEDURE — 3078F DIAST BP <80 MM HG: CPT | Mod: CPTII,,, | Performed by: SPECIALIST

## 2022-06-07 PROCEDURE — 1160F PR REVIEW ALL MEDS BY PRESCRIBER/CLIN PHARMACIST DOCUMENTED: ICD-10-PCS | Mod: CPTII,,, | Performed by: SPECIALIST

## 2022-06-07 RX ORDER — ASPIRIN 325 MG
650 TABLET, DELAYED RELEASE (ENTERIC COATED) ORAL 3 TIMES DAILY
COMMUNITY
Start: 2022-05-23

## 2022-06-07 NOTE — PROGRESS NOTES
Patient returns now 2 months out from a minimally invasive aortic valve replacement with a mechanical valve.  In the early postoperative.  He had some high fever and was worked up for endocarditis which was negative.  He ultimately returned positive for COVID at that time.  He now is feeling great.  Has no shortness of breaths and really no pain.  He does have a pericardial effusion that we have been following but again he is completely asymptomatic.  At this point I do not think we need to do anything with the pericardial effusion.  Believe he is getting a new echo in the next week or 2. His wounds have healed nicely and there is no sign of infection.  He has no groin seroma.  His lungs are clear to auscultation bilaterally  Heart has a regular rate and rhythm with good valve tones  Extremities had no cyanosis clubbing or edema  Patient is doing very well  Will allow him to follow up long-term with cardiology  Return to clinic p.r.n.

## 2022-06-15 ENCOUNTER — LAB VISIT (OUTPATIENT)
Dept: LAB | Facility: HOSPITAL | Age: 27
End: 2022-06-15
Attending: INTERNAL MEDICINE
Payer: COMMERCIAL

## 2022-06-15 DIAGNOSIS — Z95.2 HEART VALVE REPLACED BY TRANSPLANT: Primary | ICD-10-CM

## 2022-06-15 LAB
INR BLD: 3.02 (ref 2–3)
PROTHROMBIN TIME: 30.4 SECONDS (ref 11.7–14.5)

## 2022-06-15 PROCEDURE — 36415 COLL VENOUS BLD VENIPUNCTURE: CPT

## 2022-06-15 PROCEDURE — 85610 PROTHROMBIN TIME: CPT

## 2022-06-30 ENCOUNTER — LAB VISIT (OUTPATIENT)
Dept: LAB | Facility: HOSPITAL | Age: 27
End: 2022-06-30
Attending: INTERNAL MEDICINE
Payer: COMMERCIAL

## 2022-06-30 DIAGNOSIS — Z95.2 HEART VALVE REPLACED BY TRANSPLANT: Primary | ICD-10-CM

## 2022-06-30 LAB
INR BLD: 2.04 (ref 2–3)
PROTHROMBIN TIME: 22.5 SECONDS (ref 11.7–14.5)

## 2022-06-30 PROCEDURE — 85610 PROTHROMBIN TIME: CPT

## 2022-06-30 PROCEDURE — 36415 COLL VENOUS BLD VENIPUNCTURE: CPT

## 2022-07-21 ENCOUNTER — LAB VISIT (OUTPATIENT)
Dept: LAB | Facility: HOSPITAL | Age: 27
End: 2022-07-21
Attending: INTERNAL MEDICINE
Payer: COMMERCIAL

## 2022-07-21 DIAGNOSIS — Z95.2 HEART VALVE REPLACED BY TRANSPLANT: Primary | ICD-10-CM

## 2022-07-21 LAB
INR BLD: 2.35 (ref 2–3)
PROTHROMBIN TIME: 25.1 SECONDS (ref 11.7–14.5)

## 2022-07-21 PROCEDURE — 85610 PROTHROMBIN TIME: CPT

## 2022-07-21 PROCEDURE — 36415 COLL VENOUS BLD VENIPUNCTURE: CPT

## 2022-08-11 ENCOUNTER — LAB VISIT (OUTPATIENT)
Dept: LAB | Facility: HOSPITAL | Age: 27
End: 2022-08-11
Attending: INTERNAL MEDICINE
Payer: COMMERCIAL

## 2022-08-11 DIAGNOSIS — Z95.2 HEART VALVE REPLACED BY TRANSPLANT: Primary | ICD-10-CM

## 2022-08-11 LAB
INR BLD: 1.62 (ref 2–3)
PROTHROMBIN TIME: 18.8 SECONDS (ref 11.7–14.5)

## 2022-08-11 PROCEDURE — 36415 COLL VENOUS BLD VENIPUNCTURE: CPT

## 2022-08-11 PROCEDURE — 85610 PROTHROMBIN TIME: CPT

## 2022-08-25 ENCOUNTER — LAB VISIT (OUTPATIENT)
Dept: LAB | Facility: HOSPITAL | Age: 27
End: 2022-08-25
Attending: INTERNAL MEDICINE
Payer: COMMERCIAL

## 2022-08-25 DIAGNOSIS — Z95.2 HEART VALVE REPLACED BY TRANSPLANT: Primary | ICD-10-CM

## 2022-08-25 LAB
INR BLD: 2.09 (ref 2–3)
PROTHROMBIN TIME: 22.9 SECONDS (ref 11.7–14.5)

## 2022-08-25 PROCEDURE — 36415 COLL VENOUS BLD VENIPUNCTURE: CPT

## 2022-08-25 PROCEDURE — 85610 PROTHROMBIN TIME: CPT

## 2022-09-15 ENCOUNTER — LAB VISIT (OUTPATIENT)
Dept: LAB | Facility: HOSPITAL | Age: 27
End: 2022-09-15
Attending: INTERNAL MEDICINE
Payer: COMMERCIAL

## 2022-09-15 DIAGNOSIS — Z95.2 HEART VALVE REPLACED BY TRANSPLANT: Primary | ICD-10-CM

## 2022-09-15 LAB
INR BLD: 2.02 (ref 2–3)
PROTHROMBIN TIME: 22.3 SECONDS (ref 11.7–14.5)

## 2022-09-15 PROCEDURE — 85610 PROTHROMBIN TIME: CPT

## 2022-09-15 PROCEDURE — 36415 COLL VENOUS BLD VENIPUNCTURE: CPT

## 2022-10-13 ENCOUNTER — LAB VISIT (OUTPATIENT)
Dept: LAB | Facility: HOSPITAL | Age: 27
End: 2022-10-13
Attending: INTERNAL MEDICINE
Payer: COMMERCIAL

## 2022-10-13 DIAGNOSIS — Z95.2 HEART VALVE REPLACED BY TRANSPLANT: Primary | ICD-10-CM

## 2022-10-13 LAB
INR BLD: 1.67 (ref 2–3)
PROTHROMBIN TIME: 19.3 SECONDS (ref 11.7–14.5)

## 2022-10-13 PROCEDURE — 36415 COLL VENOUS BLD VENIPUNCTURE: CPT

## 2022-10-13 PROCEDURE — 85610 PROTHROMBIN TIME: CPT

## 2022-10-27 ENCOUNTER — LAB VISIT (OUTPATIENT)
Dept: LAB | Facility: HOSPITAL | Age: 27
End: 2022-10-27
Attending: INTERNAL MEDICINE
Payer: COMMERCIAL

## 2022-10-27 DIAGNOSIS — Z95.2 HEART VALVE REPLACED: Primary | ICD-10-CM

## 2022-10-27 LAB
INR BLD: 2.31 (ref 2–3)
PROTHROMBIN TIME: 24.8 SECONDS (ref 11.7–14.5)

## 2022-10-27 PROCEDURE — 36415 COLL VENOUS BLD VENIPUNCTURE: CPT

## 2022-10-27 PROCEDURE — 85610 PROTHROMBIN TIME: CPT

## 2022-11-23 ENCOUNTER — LAB VISIT (OUTPATIENT)
Dept: LAB | Facility: HOSPITAL | Age: 27
End: 2022-11-23
Attending: INTERNAL MEDICINE
Payer: COMMERCIAL

## 2022-11-23 DIAGNOSIS — Z95.2 HEART VALVE REPLACED: Primary | ICD-10-CM

## 2022-11-23 LAB
INR BLD: 3.23 (ref 2–3)
PROTHROMBIN TIME: 32 SECONDS (ref 11.7–14.5)

## 2022-11-23 PROCEDURE — 36415 COLL VENOUS BLD VENIPUNCTURE: CPT

## 2022-11-23 PROCEDURE — 85610 PROTHROMBIN TIME: CPT

## 2022-12-07 ENCOUNTER — LAB VISIT (OUTPATIENT)
Dept: LAB | Facility: HOSPITAL | Age: 27
End: 2022-12-07
Attending: INTERNAL MEDICINE
Payer: COMMERCIAL

## 2022-12-07 DIAGNOSIS — Z95.2 HEART VALVE REPLACED: Primary | ICD-10-CM

## 2022-12-07 LAB
INR BLD: 1.58 (ref 2–3)
PROTHROMBIN TIME: 18.5 SECONDS (ref 11.7–14.5)

## 2022-12-07 PROCEDURE — 85610 PROTHROMBIN TIME: CPT

## 2022-12-07 PROCEDURE — 36415 COLL VENOUS BLD VENIPUNCTURE: CPT

## 2022-12-21 ENCOUNTER — LAB VISIT (OUTPATIENT)
Dept: LAB | Facility: HOSPITAL | Age: 27
End: 2022-12-21
Attending: INTERNAL MEDICINE
Payer: COMMERCIAL

## 2022-12-21 DIAGNOSIS — Z95.2 HEART VALVE REPLACED BY TRANSPLANT: Primary | ICD-10-CM

## 2022-12-21 LAB
INR BLD: 1.7 (ref 2–3)
PROTHROMBIN TIME: 19.5 SECONDS (ref 11.7–14.5)

## 2022-12-21 PROCEDURE — 85610 PROTHROMBIN TIME: CPT

## 2022-12-21 PROCEDURE — 36415 COLL VENOUS BLD VENIPUNCTURE: CPT

## 2022-12-28 ENCOUNTER — LAB VISIT (OUTPATIENT)
Dept: LAB | Facility: HOSPITAL | Age: 27
End: 2022-12-28
Attending: INTERNAL MEDICINE
Payer: COMMERCIAL

## 2022-12-28 DIAGNOSIS — Z95.2 HEART VALVE REPLACED BY TRANSPLANT: Primary | ICD-10-CM

## 2022-12-28 LAB
INR BLD: 2.06 (ref 2–3)
PROTHROMBIN TIME: 22.7 SECONDS (ref 11.7–14.5)

## 2022-12-28 PROCEDURE — 85610 PROTHROMBIN TIME: CPT

## 2022-12-28 PROCEDURE — 36415 COLL VENOUS BLD VENIPUNCTURE: CPT

## 2023-01-11 ENCOUNTER — LAB VISIT (OUTPATIENT)
Dept: LAB | Facility: HOSPITAL | Age: 28
End: 2023-01-11
Attending: INTERNAL MEDICINE
Payer: COMMERCIAL

## 2023-01-11 DIAGNOSIS — Z95.2 HEART VALVE REPLACED BY TRANSPLANT: Primary | ICD-10-CM

## 2023-01-11 LAB
INR BLD: 2.43 (ref 2–3)
PROTHROMBIN TIME: 25.8 SECONDS (ref 11.7–14.5)

## 2023-01-11 PROCEDURE — 85610 PROTHROMBIN TIME: CPT

## 2023-01-11 PROCEDURE — 36415 COLL VENOUS BLD VENIPUNCTURE: CPT

## 2023-02-08 ENCOUNTER — LAB VISIT (OUTPATIENT)
Dept: LAB | Facility: HOSPITAL | Age: 28
End: 2023-02-08
Attending: INTERNAL MEDICINE
Payer: COMMERCIAL

## 2023-02-08 DIAGNOSIS — Z95.2 HEART VALVE REPLACED BY TRANSPLANT: Primary | ICD-10-CM

## 2023-02-08 LAB
INR BLD: 2.59 (ref 2–3)
PROTHROMBIN TIME: 27.1 SECONDS (ref 11.7–14.5)

## 2023-02-08 PROCEDURE — 36415 COLL VENOUS BLD VENIPUNCTURE: CPT

## 2023-02-08 PROCEDURE — 85610 PROTHROMBIN TIME: CPT

## 2023-03-08 ENCOUNTER — LAB VISIT (OUTPATIENT)
Dept: LAB | Facility: HOSPITAL | Age: 28
End: 2023-03-08
Attending: INTERNAL MEDICINE
Payer: COMMERCIAL

## 2023-03-08 DIAGNOSIS — Z95.2 HEART VALVE REPLACED BY TRANSPLANT: Primary | ICD-10-CM

## 2023-03-08 DIAGNOSIS — Z01.84 IMMUNITY STATUS TESTING: ICD-10-CM

## 2023-03-08 LAB
INR BLD: 2.15 (ref 2–3)
PROTHROMBIN TIME: 23.4 SECONDS (ref 11.7–14.5)

## 2023-03-08 PROCEDURE — 86906 BLD TYPING SEROLOGIC RH PHNT: CPT | Performed by: INTERNAL MEDICINE

## 2023-03-08 PROCEDURE — 36415 COLL VENOUS BLD VENIPUNCTURE: CPT

## 2023-03-08 PROCEDURE — 85610 PROTHROMBIN TIME: CPT

## 2023-03-08 PROCEDURE — 86902 BLOOD TYPE ANTIGEN DONOR EA: CPT | Performed by: INTERNAL MEDICINE

## 2023-03-24 LAB — ANTIGEN TYPING RBC: NORMAL

## 2023-04-05 ENCOUNTER — LAB VISIT (OUTPATIENT)
Dept: LAB | Facility: HOSPITAL | Age: 28
End: 2023-04-05
Attending: INTERNAL MEDICINE
Payer: COMMERCIAL

## 2023-04-05 DIAGNOSIS — Z95.2 HEART VALVE REPLACED BY TRANSPLANT: Primary | ICD-10-CM

## 2023-04-05 LAB
INR BLD: 2.13 (ref 2–3)
PROTHROMBIN TIME: 23.2 SECONDS (ref 11.7–14.5)

## 2023-04-05 PROCEDURE — 36415 COLL VENOUS BLD VENIPUNCTURE: CPT

## 2023-04-05 PROCEDURE — 85610 PROTHROMBIN TIME: CPT

## 2023-05-03 ENCOUNTER — LAB VISIT (OUTPATIENT)
Dept: LAB | Facility: HOSPITAL | Age: 28
End: 2023-05-03
Attending: INTERNAL MEDICINE
Payer: COMMERCIAL

## 2023-05-03 DIAGNOSIS — Z95.2 HEART VALVE REPLACED BY TRANSPLANT: Primary | ICD-10-CM

## 2023-05-03 LAB
INR BLD: 2.08 (ref 2–3)
PROTHROMBIN TIME: 22.8 SECONDS (ref 11.7–14.5)

## 2023-05-03 PROCEDURE — 36415 COLL VENOUS BLD VENIPUNCTURE: CPT

## 2023-05-03 PROCEDURE — 85610 PROTHROMBIN TIME: CPT

## 2023-06-07 ENCOUNTER — LAB VISIT (OUTPATIENT)
Dept: LAB | Facility: HOSPITAL | Age: 28
End: 2023-06-07
Attending: INTERNAL MEDICINE
Payer: COMMERCIAL

## 2023-06-07 DIAGNOSIS — Z95.2 HEART VALVE REPLACED BY TRANSPLANT: Primary | ICD-10-CM

## 2023-06-07 LAB
INR BLD: 2.61 (ref 2–3)
PROTHROMBIN TIME: 28 SECONDS (ref 11.7–14.5)

## 2023-06-07 PROCEDURE — 36415 COLL VENOUS BLD VENIPUNCTURE: CPT

## 2023-06-07 PROCEDURE — 85610 PROTHROMBIN TIME: CPT

## 2023-07-05 ENCOUNTER — LAB VISIT (OUTPATIENT)
Dept: LAB | Facility: HOSPITAL | Age: 28
End: 2023-07-05
Attending: INTERNAL MEDICINE
Payer: COMMERCIAL

## 2023-07-05 DIAGNOSIS — Z95.2 HEART VALVE REPLACED BY TRANSPLANT: Primary | ICD-10-CM

## 2023-07-05 LAB
INR BLD: 2.58 (ref 2–3)
PROTHROMBIN TIME: 27.8 SECONDS (ref 11.7–14.5)

## 2023-07-05 PROCEDURE — 36415 COLL VENOUS BLD VENIPUNCTURE: CPT

## 2023-07-05 PROCEDURE — 85610 PROTHROMBIN TIME: CPT

## 2023-08-09 ENCOUNTER — LAB VISIT (OUTPATIENT)
Dept: LAB | Facility: HOSPITAL | Age: 28
End: 2023-08-09
Attending: INTERNAL MEDICINE
Payer: COMMERCIAL

## 2023-08-09 DIAGNOSIS — Z95.2 HEART VALVE REPLACED BY TRANSPLANT: Primary | ICD-10-CM

## 2023-08-09 LAB
INR PPP: 2.4 (ref 2–3)
PROTHROMBIN TIME: 26.5 SECONDS (ref 11.7–14.5)

## 2023-08-09 PROCEDURE — 36415 COLL VENOUS BLD VENIPUNCTURE: CPT

## 2023-08-09 PROCEDURE — 85610 PROTHROMBIN TIME: CPT

## 2023-09-07 ENCOUNTER — LAB VISIT (OUTPATIENT)
Dept: LAB | Facility: HOSPITAL | Age: 28
End: 2023-09-07
Attending: INTERNAL MEDICINE
Payer: COMMERCIAL

## 2023-09-07 DIAGNOSIS — Z95.2 HEART VALVE REPLACED BY TRANSPLANT: Primary | ICD-10-CM

## 2023-09-07 LAB
INR PPP: 3.5 (ref 2–3)
PROTHROMBIN TIME: 35.1 SECONDS (ref 11.7–14.5)

## 2023-09-07 PROCEDURE — 36415 COLL VENOUS BLD VENIPUNCTURE: CPT

## 2023-09-07 PROCEDURE — 85610 PROTHROMBIN TIME: CPT

## 2023-10-11 ENCOUNTER — LAB VISIT (OUTPATIENT)
Dept: LAB | Facility: HOSPITAL | Age: 28
End: 2023-10-11
Attending: INTERNAL MEDICINE
Payer: COMMERCIAL

## 2023-10-11 DIAGNOSIS — Z95.2 HEART VALVE REPLACED BY TRANSPLANT: Primary | ICD-10-CM

## 2023-10-11 LAB
INR PPP: 2 (ref 2–3)
PROTHROMBIN TIME: 22.7 SECONDS (ref 11.7–14.5)

## 2023-10-11 PROCEDURE — 36415 COLL VENOUS BLD VENIPUNCTURE: CPT

## 2023-10-11 PROCEDURE — 85610 PROTHROMBIN TIME: CPT

## 2023-11-09 ENCOUNTER — LAB VISIT (OUTPATIENT)
Dept: LAB | Facility: HOSPITAL | Age: 28
End: 2023-11-09
Attending: INTERNAL MEDICINE
Payer: COMMERCIAL

## 2023-11-09 DIAGNOSIS — Z95.2 HEART VALVE REPLACED BY TRANSPLANT: Primary | ICD-10-CM

## 2023-11-09 LAB
INR PPP: 2.3 (ref 2–3)
PROTHROMBIN TIME: 25.2 SECONDS (ref 11.7–14.5)

## 2023-11-09 PROCEDURE — 36415 COLL VENOUS BLD VENIPUNCTURE: CPT

## 2023-11-09 PROCEDURE — 85610 PROTHROMBIN TIME: CPT

## 2023-12-14 ENCOUNTER — LAB VISIT (OUTPATIENT)
Dept: LAB | Facility: HOSPITAL | Age: 28
End: 2023-12-14
Attending: INTERNAL MEDICINE
Payer: COMMERCIAL

## 2023-12-14 DIAGNOSIS — Z95.2 HEART VALVE REPLACED BY TRANSPLANT: Primary | ICD-10-CM

## 2023-12-14 LAB
INR PPP: 2.2 (ref 2–3)
PROTHROMBIN TIME: 24.2 SECONDS (ref 11.7–14.5)

## 2023-12-14 PROCEDURE — 85610 PROTHROMBIN TIME: CPT

## 2023-12-14 PROCEDURE — 36415 COLL VENOUS BLD VENIPUNCTURE: CPT

## 2024-01-12 ENCOUNTER — LAB VISIT (OUTPATIENT)
Dept: LAB | Facility: HOSPITAL | Age: 29
End: 2024-01-12
Attending: INTERNAL MEDICINE
Payer: COMMERCIAL

## 2024-01-12 DIAGNOSIS — Z95.2 HEART VALVE REPLACED BY TRANSPLANT: Primary | ICD-10-CM

## 2024-01-12 LAB
INR PPP: 2.1 (ref 2–3)
PROTHROMBIN TIME: 23.4 SECONDS (ref 11.7–14.5)

## 2024-01-12 PROCEDURE — 36415 COLL VENOUS BLD VENIPUNCTURE: CPT

## 2024-01-12 PROCEDURE — 85610 PROTHROMBIN TIME: CPT

## 2024-02-14 ENCOUNTER — LAB VISIT (OUTPATIENT)
Dept: LAB | Facility: HOSPITAL | Age: 29
End: 2024-02-14
Attending: INTERNAL MEDICINE
Payer: COMMERCIAL

## 2024-02-14 DIAGNOSIS — Z95.2 HEART VALVE REPLACED BY TRANSPLANT: Primary | ICD-10-CM

## 2024-02-14 LAB
INR PPP: 2.3 (ref 2–3)
PROTHROMBIN TIME: 25.7 SECONDS (ref 11.7–14.5)

## 2024-02-14 PROCEDURE — 85610 PROTHROMBIN TIME: CPT

## 2024-02-14 PROCEDURE — 36415 COLL VENOUS BLD VENIPUNCTURE: CPT

## 2024-03-14 ENCOUNTER — LAB VISIT (OUTPATIENT)
Dept: LAB | Facility: HOSPITAL | Age: 29
End: 2024-03-14
Attending: INTERNAL MEDICINE
Payer: COMMERCIAL

## 2024-03-14 DIAGNOSIS — Z95.2 HEART VALVE REPLACED BY TRANSPLANT: Primary | ICD-10-CM

## 2024-03-14 DIAGNOSIS — Z95.2 HEART VALVE REPLACED: ICD-10-CM

## 2024-03-14 DIAGNOSIS — Z01.84 IMMUNITY STATUS TESTING: ICD-10-CM

## 2024-03-14 LAB
INR PPP: 2.1 (ref 2–3)
PROTHROMBIN TIME: 24 SECONDS (ref 11.7–14.5)

## 2024-03-14 PROCEDURE — 36415 COLL VENOUS BLD VENIPUNCTURE: CPT

## 2024-03-14 PROCEDURE — 85610 PROTHROMBIN TIME: CPT

## 2024-04-18 ENCOUNTER — LAB VISIT (OUTPATIENT)
Dept: LAB | Facility: HOSPITAL | Age: 29
End: 2024-04-18
Attending: INTERNAL MEDICINE
Payer: COMMERCIAL

## 2024-04-18 DIAGNOSIS — Z95.2 HEART VALVE REPLACED BY TRANSPLANT: Primary | ICD-10-CM

## 2024-04-18 LAB
INR PPP: 2.7 (ref 2–3)
PROTHROMBIN TIME: 29 SECONDS (ref 11.7–14.5)

## 2024-04-18 PROCEDURE — 85610 PROTHROMBIN TIME: CPT

## 2024-04-18 PROCEDURE — 36415 COLL VENOUS BLD VENIPUNCTURE: CPT

## 2024-05-01 ENCOUNTER — OFFICE VISIT (OUTPATIENT)
Dept: URGENT CARE | Facility: CLINIC | Age: 29
End: 2024-05-01
Payer: COMMERCIAL

## 2024-05-01 VITALS
BODY MASS INDEX: 28.09 KG/M2 | OXYGEN SATURATION: 98 % | HEIGHT: 67 IN | TEMPERATURE: 98 F | HEART RATE: 81 BPM | WEIGHT: 179 LBS | SYSTOLIC BLOOD PRESSURE: 129 MMHG | RESPIRATION RATE: 18 BRPM | DIASTOLIC BLOOD PRESSURE: 80 MMHG

## 2024-05-01 DIAGNOSIS — R07.81 PLEURITIC PAIN: ICD-10-CM

## 2024-05-01 DIAGNOSIS — R10.11 RIGHT UPPER QUADRANT PAIN: Primary | ICD-10-CM

## 2024-05-01 PROCEDURE — 99213 OFFICE O/P EST LOW 20 MIN: CPT | Mod: ,,, | Performed by: FAMILY MEDICINE

## 2024-05-01 RX ORDER — PREDNISONE 20 MG/1
40 TABLET ORAL DAILY
Qty: 8 TABLET | Refills: 0 | Status: SHIPPED | OUTPATIENT
Start: 2024-05-01 | End: 2024-05-05

## 2024-05-01 NOTE — PROGRESS NOTES
Patient ID: [id number]     Chief Complaint:  Pain under ribcage    History of Present Illness:     Dani Pettit is a 28 y.o. male  who presents today for symptoms of Rib pain (Pt ptresents to clinic with pain in his right rib cage. Symptomatic x last night.)    Patient has a history of heart valve replacement 2 years ago.  Last night started having pleuritic pain on his right flank.  Nontender.  No other symptoms.  Thought it was gas but Gas-X did not help.  Feels he might be somewhat constipated.  Reports that when he had his surgery they had to break ribs in the right flank area and he is concerned about this.      Past Medical History:     -------------------------------------    Pericardial effusion    Stenosis of aortic and mitral valves        No past surgical history on file.    Review of patient's allergies indicates:   Allergen Reactions    Egg derived     Shellfish containing products        Current Outpatient Medications   Medication Sig Dispense Refill    aspirin (ECOTRIN) 325 MG EC tablet Take 650 mg by mouth 3 (three) times daily.      famotidine (PEPCID) 40 MG tablet TAKE 1 TABLET BY MOUTH 30 MINUTES BEFORE SCAN      warfarin (COUMADIN) 5 MG tablet Take 1 tablet (5 mg total) by mouth Daily. 30 tablet 0    colchicine (COLCRYS) 0.6 mg tablet Take 1 tablet (0.6 mg total) by mouth 2 (two) times daily. 60 tablet 11    fluticasone propionate (FLONASE) 50 mcg/actuation nasal spray 1 spray (50 mcg total) by Each Nostril route 2 (two) times daily as needed for Rhinitis. (Patient not taking: Reported on 5/1/2024) 15 g 0    loratadine (CLARITIN) 10 mg tablet Take 1 tablet (10 mg total) by mouth once daily. 30 tablet 0    oxyCODONE-acetaminophen (PERCOCET) 5-325 mg per tablet Take 1 tablet by mouth every 4 (four) hours as needed. for pain. (Patient not taking: Reported on 5/1/2024)      pantoprazole (PROTONIX) 40 MG tablet Take 1 tablet (40 mg total) by mouth once daily. 30 tablet 11    predniSONE  "(DELTASONE) 20 MG tablet Take 2 tablets (40 mg total) by mouth once daily. for 4 days 8 tablet 0     No current facility-administered medications for this visit.       Social History     Socioeconomic History    Marital status: Single   Tobacco Use    Smoking status: Former    Smokeless tobacco: Never        Family History   Problem Relation Name Age of Onset    No Known Problems Mother      No Known Problems Father      No Known Problems Sister      No Known Problems Brother      No Known Problems Maternal Aunt      No Known Problems Maternal Uncle      No Known Problems Paternal Aunt      No Known Problems Paternal Uncle      No Known Problems Maternal Grandmother      No Known Problems Maternal Grandfather      No Known Problems Paternal Grandmother      No Known Problems Paternal Grandfather          Subjective:     Review of Systems   Constitutional:  Negative for chills, fever and malaise/fatigue.   Musculoskeletal:  Positive for joint pain. Negative for back pain and falls.   Neurological:  Negative for dizziness, tingling, sensory change, speech change, focal weakness, seizures and loss of consciousness.       Objective:     /80   Pulse 81   Temp 98.3 °F (36.8 °C)   Resp 18   Ht 5' 7" (1.702 m)   Wt 81.2 kg (179 lb)   SpO2 98%   BMI 28.04 kg/m²     Physical Exam  Constitutional:       General: He is not in acute distress.     Appearance: Normal appearance. He is not ill-appearing or toxic-appearing.   HENT:      Head: Normocephalic and atraumatic.   Cardiovascular:      Rate and Rhythm: Normal rate and regular rhythm.   Pulmonary:      Effort: Pulmonary effort is normal. No respiratory distress.      Breath sounds: Normal breath sounds.   Abdominal:      Palpations: There is no mass.      Tenderness: There is no abdominal tenderness. There is no guarding or rebound.      Comments: Abdomen is nontender in all quadrants   Musculoskeletal:         General: No swelling, tenderness, deformity or " signs of injury.      Comments: Examination of the right-sided ribcage is normal.  There is no tenderness along the lower mid axillary line which is where the pleuritic pain is present.  There is no obvious deformity or skin changes   Skin:     Findings: No bruising, erythema or rash.   Neurological:      General: No focal deficit present.      Mental Status: He is alert and oriented to person, place, and time. Mental status is at baseline.      Sensory: No sensory deficit.      Motor: No weakness.      Gait: Gait normal.      Deep Tendon Reflexes: Reflexes normal.   Psychiatric:         Mood and Affect: Mood normal.         Behavior: Behavior normal.         Assessment & Plan:       ICD-10-CM ICD-9-CM   1. Right upper quadrant pain  R10.11 789.01   2. Pleuritic pain  R07.81 786.52        1. Right upper quadrant pain  -     X-Ray Abdomen Flat And Erect    2. Pleuritic pain  -     X-Ray Ribs 2 View Right; Future; Expected date: 05/01/2024  -     X-Ray Chest PA And Lateral; Future; Expected date: 05/01/2024    Other orders  -     predniSONE (DELTASONE) 20 MG tablet; Take 2 tablets (40 mg total) by mouth once daily. for 4 days  Dispense: 8 tablet; Refill: 0       Unclear etiology of pleuritic pain.  Likely musculoskeletal but nontender.  Does show some increased bowel gas in the right upper upper quadrant of the abdominal x-ray.  None of the x-rays reviewed today by me show any obvious pathology but we will await formal x-ray report and let patient know.  If it is constipation he will use over-the-counter medications.  If it is rib related or perhaps pleuritic from some pleural inflammation, he has to avoid NSAIDs secondary to mechanical heart valve taking Coumadin.  As such we will give him prednisone for a few days to see if this close the inflammation.  ER return precautions given.

## 2024-05-01 NOTE — PATIENT INSTRUCTIONS
Prednisone once daily for 4 days    OTC medications for constipation.  Can take MiraLax twice a day and magnesium citrate once daily    Follow up with your PCP for further evaluation and management    Go to the emergency room if you develop severe changes or increase in symptoms

## 2024-05-15 ENCOUNTER — LAB VISIT (OUTPATIENT)
Dept: LAB | Facility: HOSPITAL | Age: 29
End: 2024-05-15
Attending: INTERNAL MEDICINE
Payer: COMMERCIAL

## 2024-05-15 DIAGNOSIS — Z95.2 HEART VALVE REPLACED BY TRANSPLANT: Primary | ICD-10-CM

## 2024-05-15 LAB
INR PPP: 2.3 (ref 2–3)
PROTHROMBIN TIME: 24.9 SECONDS (ref 11.7–14.5)

## 2024-05-15 PROCEDURE — 36415 COLL VENOUS BLD VENIPUNCTURE: CPT

## 2024-05-15 PROCEDURE — 85610 PROTHROMBIN TIME: CPT

## 2024-06-16 ENCOUNTER — OFFICE VISIT (OUTPATIENT)
Dept: URGENT CARE | Facility: CLINIC | Age: 29
End: 2024-06-16
Payer: COMMERCIAL

## 2024-06-16 VITALS
HEART RATE: 71 BPM | DIASTOLIC BLOOD PRESSURE: 83 MMHG | RESPIRATION RATE: 20 BRPM | BODY MASS INDEX: 28.09 KG/M2 | TEMPERATURE: 98 F | SYSTOLIC BLOOD PRESSURE: 119 MMHG | WEIGHT: 179 LBS | OXYGEN SATURATION: 97 % | HEIGHT: 67 IN

## 2024-06-16 DIAGNOSIS — M79.602 LEFT ARM PAIN: Primary | ICD-10-CM

## 2024-06-16 DIAGNOSIS — Z79.01 ANTICOAGULATED: ICD-10-CM

## 2024-06-16 DIAGNOSIS — T14.8XXA HEMATOMA AND CONTUSION: ICD-10-CM

## 2024-06-16 PROCEDURE — 99202 OFFICE O/P NEW SF 15 MIN: CPT | Mod: ,,, | Performed by: PHYSICIAN ASSISTANT

## 2024-06-16 NOTE — PROGRESS NOTES
"Subjective:      Patient ID: Dani Pettit is a 28 y.o. male.    Vitals:  height is 5' 7" (1.702 m) and weight is 81.2 kg (179 lb). His oral temperature is 97.7 °F (36.5 °C). His blood pressure is 119/83 and his pulse is 71. His respiration is 20 and oxygen saturation is 97%.     Chief Complaint: Arm Pain    Right-hand dominant male with mechanical heart valve anticoagulated with Coumadin reports 8 days ago having blunt trauma garage door spring release with metal bracket striking forearms 4-5 times bilateral forearms with persistent left forearm bruising pain and swelling presents to urgent care today for initial evaluation unresolved with Tylenol elevation and ice.  Patient denies numbness paresthesia, hand or wrist problems.      Arm Injury   His dominant hand is their right hand. The incident occurred more than 1 week ago. The incident occurred at home. Pertinent negatives include no chest pain or numbness.     Cardiovascular:  Negative for chest trauma, chest pain and passing out.   Respiratory:  Negative for shortness of breath.    Musculoskeletal:  Positive for pain and trauma. Negative for joint pain, joint swelling and abnormal ROM of joint.   Skin:  Positive for bruising. Negative for abrasion, laceration, puncture wound, erythema and abscess.   Neurological:  Negative for numbness and tingling.   Hematologic/Lymphatic: Positive for trouble clotting.      Objective:     Physical Exam   Constitutional: He is oriented to person, place, and time. He appears well-developed.  Non-toxic appearance.      Comments:Awake alert pleasant ambulatory male     HENT:   Head: Normocephalic and atraumatic. Head is without abrasion, without contusion and without laceration.   Mouth/Throat: Oropharynx is clear and moist and mucous membranes are normal.   Eyes: EOM and lids are normal.   Neck: Trachea normal and phonation normal. Neck supple.   Cardiovascular: Normal rate, regular rhythm, normal heart sounds and normal " pulses.      Comments: Bilateral radial pulses 2+   Pulmonary/Chest: Effort normal.   Musculoskeletal: Normal range of motion.         General: Swelling, tenderness and signs of injury present. Normal range of motion.      Right shoulder: Normal. He exhibits normal range of motion.      Left shoulder: Normal. He exhibits normal range of motion.      Right elbow: Normal.He exhibits normal range of motion, no swelling and no deformity. No tenderness found.      Left elbow: Normal. He exhibits normal range of motion, no swelling and no deformity. No tenderness found.      Right wrist: Normal. He exhibits normal range of motion, no swelling and no deformity.      Left wrist: Normal. He exhibits normal range of motion, no swelling and no deformity.      Right forearm: He exhibits tenderness and swelling. He exhibits no laceration.      Left forearm: He exhibits tenderness and swelling. He exhibits no laceration.        Arms:       Right hand: Normal. He exhibits normal range of motion, no tenderness, normal two-point discrimination, normal capillary refill, no deformity and no swelling.      Left hand: Normal. He exhibits normal range of motion, no tenderness, normal two-point discrimination, normal capillary refill, no deformity and no swelling.   Neurological: no focal deficit. He is alert and oriented to person, place, and time. He displays no weakness. No sensory deficit.   Skin: Skin is warm, dry, intact, not diaphoretic and no rash. Capillary refill takes less than 2 seconds. bruising No abrasion, No burn, No erythema and No ecchymosis   Psychiatric: His speech is normal and behavior is normal. Judgment and thought content normal.   Nursing note and vitals reviewed.         Previous History      Review of patient's allergies indicates:   Allergen Reactions    Egg derived     Shellfish containing products        Past Medical History:   Diagnosis Date    Pericardial effusion     Stenosis of aortic and mitral valves   "    Current Outpatient Medications   Medication Instructions    aspirin (ECOTRIN) 650 mg, Oral, 3 times daily    colchicine (COLCRYS) 0.6 mg, Oral, 2 times daily    famotidine (PEPCID) 40 MG tablet TAKE 1 TABLET BY MOUTH 30 MINUTES BEFORE SCAN    fluticasone propionate (FLONASE) 50 mcg, Each Nostril, 2 times daily PRN    loratadine (CLARITIN) 10 mg, Oral, Daily    oxyCODONE-acetaminophen (PERCOCET) 5-325 mg per tablet 1 tablet, Every 4 hours PRN    pantoprazole (PROTONIX) 40 mg, Oral, Daily    warfarin (COUMADIN) 5 mg, Oral, Daily     Past Surgical History:   Procedure Laterality Date    AORTIC VALVE REPLACEMENT  2022     Family History   Problem Relation Name Age of Onset    No Known Problems Mother      No Known Problems Father      No Known Problems Sister      No Known Problems Brother      No Known Problems Maternal Aunt      No Known Problems Maternal Uncle      No Known Problems Paternal Aunt      No Known Problems Paternal Uncle      No Known Problems Maternal Grandmother      No Known Problems Maternal Grandfather      No Known Problems Paternal Grandmother      No Known Problems Paternal Grandfather         Social History     Tobacco Use    Smoking status: Former    Smokeless tobacco: Never   Substance Use Topics    Alcohol use: Not Currently    Drug use: Never        Physical Exam      Vital Signs Reviewed   /83   Pulse 71   Temp 97.7 °F (36.5 °C) (Oral)   Resp 20   Ht 5' 7" (1.702 m)   Wt 81.2 kg (179 lb)   SpO2 97%   BMI 28.04 kg/m²        Procedures    Procedures     Labs     Results for orders placed or performed in visit on 05/15/24   Protime-INR   Result Value Ref Range    PT 24.9 (H) 11.7 - 14.5 seconds    INR 2.3 2.0 - 3.0       Assessment:     1. Left arm pain    2. Anticoagulated    3. Hematoma and contusion        Plan:   Patient A&O x4 with 2+ left wrist pulses.  Discuss with patient radiologist's x-ray final report no acute fracture.  Patient relieved with radiologist's report.  " Discussed still concern for distal left forearm pain, hematoma and anticoagulation status.  Patient elects for over-the-counter Tylenol declines prescription pain medication.  Last INR 2.3 approximately 1 month ago.  Patient without pallor paresthesia or pulselessness to left-hand.  Patient reports open standing order for repeat INR or through cardiologist's office will contact tomorrow potential outpatient vascular ultrasound with strict emergency department recommendations.    Radiologist's forearm x-ray final reportsno acute fracture.    High concern for hematoma secondary to blunt contusion and anticoagulated status.  Recommend elevate and ice arms to help reduce swelling and inflammation.  May continue Tylenol if needed for mild-to-moderate pain.      Recommend contacting cardiology office notification of blunt trauma contusions pending next INR testing and potential outpatient vascular ultrasound.  If numbness or tingling develops to hands, chest pain or shortness develops recommend emergency department evaluation sooner.  Left arm pain  -     XR FOREARM LEFT; Future; Expected date: 06/16/2024    Anticoagulated    Hematoma and contusion

## 2024-06-16 NOTE — PATIENT INSTRUCTIONS
Radiologist's forearm x-ray final reportsno acute fracture.    High concern for hematoma secondary to blunt contusion and anticoagulated status.  Recommend elevate and ice arms to help reduce swelling and inflammation.  May continue Tylenol if needed for mild-to-moderate pain.      Recommend contacting cardiology office notification of blunt trauma contusions pending next INR testing and potential outpatient vascular ultrasound.  If numbness or tingling develops to hands, chest pain or shortness develops recommend emergency department evaluation sooner.

## 2024-06-18 ENCOUNTER — LAB VISIT (OUTPATIENT)
Dept: LAB | Facility: HOSPITAL | Age: 29
End: 2024-06-18
Attending: INTERNAL MEDICINE
Payer: COMMERCIAL

## 2024-06-18 DIAGNOSIS — Z95.2 HEART VALVE REPLACED BY TRANSPLANT: Primary | ICD-10-CM

## 2024-06-18 LAB
INR PPP: 3.1 (ref 2–3)
PROTHROMBIN TIME: 32.9 SECONDS (ref 11.7–14.5)

## 2024-06-18 PROCEDURE — 36415 COLL VENOUS BLD VENIPUNCTURE: CPT

## 2024-06-18 PROCEDURE — 85610 PROTHROMBIN TIME: CPT

## 2024-07-23 ENCOUNTER — LAB VISIT (OUTPATIENT)
Dept: LAB | Facility: HOSPITAL | Age: 29
End: 2024-07-23
Attending: INTERNAL MEDICINE
Payer: COMMERCIAL

## 2024-07-23 DIAGNOSIS — Z01.84 IMMUNITY STATUS TESTING: ICD-10-CM

## 2024-07-23 DIAGNOSIS — Z95.2 HEART VALVE REPLACED: ICD-10-CM

## 2024-07-23 DIAGNOSIS — Z95.2 HEART VALVE REPLACED BY TRANSPLANT: Primary | ICD-10-CM

## 2024-07-23 LAB
INR PPP: 1.8 (ref 2–3)
PROTHROMBIN TIME: 21.6 SECONDS (ref 11.7–14.5)

## 2024-07-23 PROCEDURE — 36415 COLL VENOUS BLD VENIPUNCTURE: CPT

## 2024-07-23 PROCEDURE — 85610 PROTHROMBIN TIME: CPT

## 2024-08-21 ENCOUNTER — LAB VISIT (OUTPATIENT)
Dept: LAB | Facility: HOSPITAL | Age: 29
End: 2024-08-21
Attending: STUDENT IN AN ORGANIZED HEALTH CARE EDUCATION/TRAINING PROGRAM
Payer: COMMERCIAL

## 2024-08-21 DIAGNOSIS — Z95.2 HEART VALVE REPLACED BY TRANSPLANT: Primary | ICD-10-CM

## 2024-08-21 DIAGNOSIS — Z91.018 FOOD ALLERGY: ICD-10-CM

## 2024-08-21 LAB
INR PPP: 2.1 (ref 2–3)
PROTHROMBIN TIME: 23.6 SECONDS (ref 11.7–14.5)

## 2024-08-21 PROCEDURE — 86003 ALLG SPEC IGE CRUDE XTRC EA: CPT | Mod: 59

## 2024-08-21 PROCEDURE — 85610 PROTHROMBIN TIME: CPT

## 2024-08-21 PROCEDURE — 36415 COLL VENOUS BLD VENIPUNCTURE: CPT

## 2024-08-21 PROCEDURE — 86003 ALLG SPEC IGE CRUDE XTRC EA: CPT

## 2024-08-22 LAB
ALLERGEN CRAB IGE (OLG): 0.16 KUA/L
ALLERGEN EGG WHOLE IGE (OLG): <0.1 KUA/L
ALLERGEN LOBSTER IGE (OLG): <0.1 KUA/L
ALLERGEN SHRIMP IGE (OLG): 0.34 KUA/L
ALLERGEN TUNA IGE (OLG): <0.1 KUA/L
PHADIOTOP IGE QN: 379 KU/L

## 2024-08-28 LAB — MAYO GENERIC ORDERABLE RESULT: NORMAL

## 2024-09-24 ENCOUNTER — LAB VISIT (OUTPATIENT)
Dept: LAB | Facility: HOSPITAL | Age: 29
End: 2024-09-24
Attending: INTERNAL MEDICINE
Payer: COMMERCIAL

## 2024-09-24 DIAGNOSIS — Z95.2 HEART VALVE REPLACED BY TRANSPLANT: Primary | ICD-10-CM

## 2024-09-24 LAB
INR PPP: 2.4 (ref 2–3)
PROTHROMBIN TIME: 26.9 SECONDS (ref 11.7–14.5)

## 2024-09-24 PROCEDURE — 36415 COLL VENOUS BLD VENIPUNCTURE: CPT

## 2024-09-24 PROCEDURE — 85610 PROTHROMBIN TIME: CPT

## 2024-11-06 ENCOUNTER — LAB VISIT (OUTPATIENT)
Dept: LAB | Facility: HOSPITAL | Age: 29
End: 2024-11-06
Attending: INTERNAL MEDICINE
Payer: COMMERCIAL

## 2024-11-06 DIAGNOSIS — Z95.2 HEART VALVE REPLACED BY TRANSPLANT: Primary | ICD-10-CM

## 2024-11-06 DIAGNOSIS — Z91.018 FOOD ALLERGY: ICD-10-CM

## 2024-11-06 DIAGNOSIS — Z01.84 IMMUNITY STATUS TESTING: ICD-10-CM

## 2024-11-06 LAB
INR PPP: 2.2 (ref 2–3)
PROTHROMBIN TIME: 24.9 SECONDS (ref 11.7–14.5)

## 2024-11-06 PROCEDURE — 36415 COLL VENOUS BLD VENIPUNCTURE: CPT

## 2024-11-06 PROCEDURE — 85610 PROTHROMBIN TIME: CPT

## 2024-11-29 ENCOUNTER — LAB VISIT (OUTPATIENT)
Dept: LAB | Facility: HOSPITAL | Age: 29
End: 2024-11-29
Attending: INTERNAL MEDICINE
Payer: COMMERCIAL

## 2024-11-29 DIAGNOSIS — Z95.2 HEART VALVE REPLACED: ICD-10-CM

## 2024-11-29 DIAGNOSIS — Z91.018 FOOD ALLERGY: ICD-10-CM

## 2024-11-29 DIAGNOSIS — Z95.2 HEART VALVE REPLACED BY TRANSPLANT: Primary | ICD-10-CM

## 2024-11-29 DIAGNOSIS — Z01.84 IMMUNITY STATUS TESTING: ICD-10-CM

## 2024-11-29 LAB
INR PPP: 2.1 (ref 2–3)
PROTHROMBIN TIME: 24 SECONDS (ref 11.7–14.5)

## 2024-11-29 PROCEDURE — 85610 PROTHROMBIN TIME: CPT

## 2024-11-29 PROCEDURE — 36415 COLL VENOUS BLD VENIPUNCTURE: CPT

## 2024-12-16 ENCOUNTER — LAB VISIT (OUTPATIENT)
Dept: LAB | Facility: HOSPITAL | Age: 29
End: 2024-12-16
Attending: INTERNAL MEDICINE
Payer: COMMERCIAL

## 2024-12-16 DIAGNOSIS — Z95.2 HEART VALVE REPLACED BY TRANSPLANT: Primary | ICD-10-CM

## 2024-12-16 DIAGNOSIS — Z91.018 FOOD ALLERGY: ICD-10-CM

## 2024-12-16 DIAGNOSIS — Z01.84 IMMUNITY STATUS TESTING: ICD-10-CM

## 2024-12-16 DIAGNOSIS — Z95.2 HEART VALVE REPLACED: ICD-10-CM

## 2024-12-16 LAB
ALBUMIN SERPL-MCNC: 4.3 G/DL (ref 3.5–5)
ALBUMIN/GLOB SERPL: 1.4 RATIO (ref 1.1–2)
ALP SERPL-CCNC: 60 UNIT/L (ref 40–150)
ALT SERPL-CCNC: 33 UNIT/L (ref 0–55)
ANION GAP SERPL CALC-SCNC: 6 MEQ/L
AST SERPL-CCNC: 23 UNIT/L (ref 5–34)
BASOPHILS # BLD AUTO: 0.03 X10(3)/MCL
BASOPHILS NFR BLD AUTO: 0.9 %
BILIRUB SERPL-MCNC: 0.4 MG/DL
BUN SERPL-MCNC: 29.7 MG/DL (ref 8.9–20.6)
CALCIUM SERPL-MCNC: 9.6 MG/DL (ref 8.4–10.2)
CHLORIDE SERPL-SCNC: 108 MMOL/L (ref 98–107)
CHOLEST SERPL-MCNC: 206 MG/DL
CHOLEST/HDLC SERPL: 6 {RATIO} (ref 0–5)
CO2 SERPL-SCNC: 26 MMOL/L (ref 22–29)
CREAT SERPL-MCNC: 0.93 MG/DL (ref 0.72–1.25)
CREAT/UREA NIT SERPL: 32
EOSINOPHIL # BLD AUTO: 0.08 X10(3)/MCL (ref 0–0.9)
EOSINOPHIL NFR BLD AUTO: 2.4 %
ERYTHROCYTE [DISTWIDTH] IN BLOOD BY AUTOMATED COUNT: 13 % (ref 11.5–17)
GFR SERPLBLD CREATININE-BSD FMLA CKD-EPI: >60 ML/MIN/1.73/M2
GLOBULIN SER-MCNC: 3.1 GM/DL (ref 2.4–3.5)
GLUCOSE SERPL-MCNC: 96 MG/DL (ref 74–100)
HCT VFR BLD AUTO: 44.2 % (ref 42–52)
HDLC SERPL-MCNC: 36 MG/DL (ref 35–60)
HGB BLD-MCNC: 15.1 G/DL (ref 14–18)
IMM GRANULOCYTES # BLD AUTO: 0.01 X10(3)/MCL (ref 0–0.04)
IMM GRANULOCYTES NFR BLD AUTO: 0.3 %
LDLC SERPL CALC-MCNC: 144 MG/DL (ref 50–140)
LYMPHOCYTES # BLD AUTO: 1.16 X10(3)/MCL (ref 0.6–4.6)
LYMPHOCYTES NFR BLD AUTO: 34.8 %
MCH RBC QN AUTO: 28.7 PG (ref 27–31)
MCHC RBC AUTO-ENTMCNC: 34.2 G/DL (ref 33–36)
MCV RBC AUTO: 83.9 FL (ref 80–94)
MONOCYTES # BLD AUTO: 0.29 X10(3)/MCL (ref 0.1–1.3)
MONOCYTES NFR BLD AUTO: 8.7 %
NEUTROPHILS # BLD AUTO: 1.76 X10(3)/MCL (ref 2.1–9.2)
NEUTROPHILS NFR BLD AUTO: 52.9 %
NRBC BLD AUTO-RTO: 0 %
PLATELET # BLD AUTO: 157 X10(3)/MCL (ref 130–400)
PMV BLD AUTO: 9.9 FL (ref 7.4–10.4)
POTASSIUM SERPL-SCNC: 4.2 MMOL/L (ref 3.5–5.1)
PROT SERPL-MCNC: 7.4 GM/DL (ref 6.4–8.3)
PSA SERPL-MCNC: 0.61 NG/ML
RBC # BLD AUTO: 5.27 X10(6)/MCL (ref 4.7–6.1)
SODIUM SERPL-SCNC: 140 MMOL/L (ref 136–145)
T3FREE SERPL-MCNC: 3.23 PG/ML (ref 1.58–3.91)
T4 FREE SERPL-MCNC: 1.12 NG/DL (ref 0.7–1.48)
TRIGL SERPL-MCNC: 131 MG/DL (ref 34–140)
TSH SERPL-ACNC: 1.17 UIU/ML (ref 0.35–4.94)
VLDLC SERPL CALC-MCNC: 26 MG/DL
WBC # BLD AUTO: 3.33 X10(3)/MCL (ref 4.5–11.5)

## 2024-12-16 PROCEDURE — 84481 FREE ASSAY (FT-3): CPT

## 2024-12-16 PROCEDURE — 84439 ASSAY OF FREE THYROXINE: CPT

## 2024-12-16 PROCEDURE — 84153 ASSAY OF PSA TOTAL: CPT

## 2024-12-16 PROCEDURE — 84443 ASSAY THYROID STIM HORMONE: CPT

## 2024-12-16 PROCEDURE — 36415 COLL VENOUS BLD VENIPUNCTURE: CPT

## 2024-12-16 PROCEDURE — 85025 COMPLETE CBC W/AUTO DIFF WBC: CPT

## 2024-12-16 PROCEDURE — 80061 LIPID PANEL: CPT

## 2024-12-16 PROCEDURE — 80053 COMPREHEN METABOLIC PANEL: CPT

## 2025-01-06 ENCOUNTER — LAB VISIT (OUTPATIENT)
Dept: LAB | Facility: HOSPITAL | Age: 30
End: 2025-01-06
Attending: INTERNAL MEDICINE
Payer: COMMERCIAL

## 2025-01-06 DIAGNOSIS — Z95.2 HEART VALVE REPLACED BY TRANSPLANT: Primary | ICD-10-CM

## 2025-01-06 LAB
INR PPP: 2 (ref 2–3)
PROTHROMBIN TIME: 23.3 SECONDS (ref 11.7–14.5)

## 2025-01-06 PROCEDURE — 36415 COLL VENOUS BLD VENIPUNCTURE: CPT

## 2025-01-06 PROCEDURE — 85610 PROTHROMBIN TIME: CPT

## 2025-02-04 ENCOUNTER — LAB VISIT (OUTPATIENT)
Dept: LAB | Facility: HOSPITAL | Age: 30
End: 2025-02-04
Attending: INTERNAL MEDICINE
Payer: COMMERCIAL

## 2025-02-04 DIAGNOSIS — Z95.2 HEART VALVE REPLACED BY TRANSPLANT: Primary | ICD-10-CM

## 2025-02-04 DIAGNOSIS — Z91.018 FOOD ALLERGY: ICD-10-CM

## 2025-02-04 LAB
INR PPP: 2.1 (ref 2–3)
PROTHROMBIN TIME: 24.2 SECONDS (ref 11.7–14.5)

## 2025-02-04 PROCEDURE — 85610 PROTHROMBIN TIME: CPT

## 2025-02-04 PROCEDURE — 36415 COLL VENOUS BLD VENIPUNCTURE: CPT

## 2025-03-07 ENCOUNTER — LAB VISIT (OUTPATIENT)
Dept: LAB | Facility: HOSPITAL | Age: 30
End: 2025-03-07
Attending: INTERNAL MEDICINE
Payer: COMMERCIAL

## 2025-03-07 DIAGNOSIS — Z95.2 HEART VALVE REPLACED BY TRANSPLANT: Primary | ICD-10-CM

## 2025-03-07 LAB
INR PPP: 2.7 (ref 2–3)
PROTHROMBIN TIME: 29.5 SECONDS (ref 11.7–14.5)

## 2025-03-07 PROCEDURE — 36415 COLL VENOUS BLD VENIPUNCTURE: CPT

## 2025-03-07 PROCEDURE — 85610 PROTHROMBIN TIME: CPT

## 2025-04-08 ENCOUNTER — LAB VISIT (OUTPATIENT)
Dept: LAB | Facility: HOSPITAL | Age: 30
End: 2025-04-08
Attending: INTERNAL MEDICINE
Payer: COMMERCIAL

## 2025-04-08 DIAGNOSIS — Z95.2 HEART VALVE REPLACED BY TRANSPLANT: Primary | ICD-10-CM

## 2025-04-08 LAB
INR PPP: 2.6
PROTHROMBIN TIME: 27.8 SECONDS (ref 12.5–14.5)

## 2025-04-08 PROCEDURE — 85610 PROTHROMBIN TIME: CPT

## 2025-04-08 PROCEDURE — 36415 COLL VENOUS BLD VENIPUNCTURE: CPT

## 2025-05-12 ENCOUNTER — LAB VISIT (OUTPATIENT)
Dept: LAB | Facility: HOSPITAL | Age: 30
End: 2025-05-12
Attending: INTERNAL MEDICINE
Payer: COMMERCIAL

## 2025-05-12 DIAGNOSIS — Z95.2 HEART VALVE REPLACED BY TRANSPLANT: Primary | ICD-10-CM

## 2025-05-12 LAB
INR PPP: 2 (ref 2–3)
PROTHROMBIN TIME: 22.8 SECONDS (ref 11.7–14.5)

## 2025-05-12 PROCEDURE — 85610 PROTHROMBIN TIME: CPT

## 2025-05-12 PROCEDURE — 36415 COLL VENOUS BLD VENIPUNCTURE: CPT

## 2025-06-13 ENCOUNTER — LAB VISIT (OUTPATIENT)
Dept: LAB | Facility: HOSPITAL | Age: 30
End: 2025-06-13
Attending: INTERNAL MEDICINE
Payer: COMMERCIAL

## 2025-06-13 DIAGNOSIS — Z95.2 HEART VALVE REPLACED BY TRANSPLANT: Primary | ICD-10-CM

## 2025-06-13 LAB
INR PPP: 1.3
PROTHROMBIN TIME: 16.9 SECONDS (ref 11.7–14.5)

## 2025-06-13 PROCEDURE — 85610 PROTHROMBIN TIME: CPT

## 2025-06-13 PROCEDURE — 36415 COLL VENOUS BLD VENIPUNCTURE: CPT

## 2025-06-19 ENCOUNTER — LAB VISIT (OUTPATIENT)
Dept: LAB | Facility: HOSPITAL | Age: 30
End: 2025-06-19
Attending: INTERNAL MEDICINE
Payer: COMMERCIAL

## 2025-06-19 DIAGNOSIS — Z91.018 FOOD ALLERGY: ICD-10-CM

## 2025-06-19 DIAGNOSIS — Z01.84 IMMUNITY STATUS TESTING: ICD-10-CM

## 2025-06-19 DIAGNOSIS — Z95.2 HEART VALVE REPLACED BY TRANSPLANT: Primary | ICD-10-CM

## 2025-06-19 DIAGNOSIS — Z95.2 HEART VALVE REPLACED: ICD-10-CM

## 2025-06-19 LAB
INR PPP: 1.6
PROTHROMBIN TIME: 19.3 SECONDS (ref 11.7–14.5)

## 2025-06-19 PROCEDURE — 85610 PROTHROMBIN TIME: CPT

## 2025-06-19 PROCEDURE — 36415 COLL VENOUS BLD VENIPUNCTURE: CPT

## 2025-06-23 ENCOUNTER — LAB VISIT (OUTPATIENT)
Dept: LAB | Facility: HOSPITAL | Age: 30
End: 2025-06-23
Attending: INTERNAL MEDICINE
Payer: COMMERCIAL

## 2025-06-23 DIAGNOSIS — Z01.84 IMMUNITY STATUS TESTING: ICD-10-CM

## 2025-06-23 DIAGNOSIS — Z95.2 HEART VALVE REPLACED: ICD-10-CM

## 2025-06-23 DIAGNOSIS — Z91.018 FOOD ALLERGY: ICD-10-CM

## 2025-06-23 DIAGNOSIS — Z95.2 HEART VALVE REPLACED BY TRANSPLANT: Primary | ICD-10-CM

## 2025-06-23 LAB
INR PPP: 2.5
PROTHROMBIN TIME: 27.2 SECONDS (ref 11.7–14.5)

## 2025-06-23 PROCEDURE — 85610 PROTHROMBIN TIME: CPT

## 2025-06-23 PROCEDURE — 36415 COLL VENOUS BLD VENIPUNCTURE: CPT

## 2025-07-07 ENCOUNTER — LAB VISIT (OUTPATIENT)
Dept: LAB | Facility: HOSPITAL | Age: 30
End: 2025-07-07
Attending: INTERNAL MEDICINE
Payer: COMMERCIAL

## 2025-07-07 DIAGNOSIS — Z95.2 HEART VALVE REPLACED BY TRANSPLANT: Primary | ICD-10-CM

## 2025-07-07 DIAGNOSIS — Z91.018 FOOD ALLERGY: ICD-10-CM

## 2025-07-07 DIAGNOSIS — Z95.2 HEART VALVE REPLACED: ICD-10-CM

## 2025-07-07 DIAGNOSIS — Z01.84 IMMUNITY STATUS TESTING: ICD-10-CM

## 2025-07-07 LAB
INR PPP: 1.3
PROTHROMBIN TIME: 16 SECONDS (ref 11.7–14.5)

## 2025-07-07 PROCEDURE — 36415 COLL VENOUS BLD VENIPUNCTURE: CPT

## 2025-07-07 PROCEDURE — 85610 PROTHROMBIN TIME: CPT

## 2025-07-15 ENCOUNTER — LAB VISIT (OUTPATIENT)
Dept: LAB | Facility: HOSPITAL | Age: 30
End: 2025-07-15
Attending: INTERNAL MEDICINE
Payer: COMMERCIAL

## 2025-07-15 DIAGNOSIS — Z95.2 HEART VALVE REPLACED BY TRANSPLANT: Primary | ICD-10-CM

## 2025-07-15 DIAGNOSIS — Z95.2 HEART VALVE REPLACED: ICD-10-CM

## 2025-07-15 DIAGNOSIS — Z91.018 FOOD ALLERGY: ICD-10-CM

## 2025-07-15 DIAGNOSIS — Z01.84 IMMUNITY STATUS TESTING: ICD-10-CM

## 2025-07-15 LAB
INR PPP: 2
PROTHROMBIN TIME: 22.7 SECONDS (ref 11.7–14.5)

## 2025-07-15 PROCEDURE — 36415 COLL VENOUS BLD VENIPUNCTURE: CPT

## 2025-07-15 PROCEDURE — 85610 PROTHROMBIN TIME: CPT

## 2025-08-12 ENCOUNTER — LAB VISIT (OUTPATIENT)
Dept: LAB | Facility: HOSPITAL | Age: 30
End: 2025-08-12
Attending: INTERNAL MEDICINE
Payer: COMMERCIAL

## 2025-08-12 DIAGNOSIS — Z91.018 FOOD ALLERGY: ICD-10-CM

## 2025-08-12 DIAGNOSIS — Z01.84 IMMUNITY STATUS TESTING: ICD-10-CM

## 2025-08-12 DIAGNOSIS — Z95.2 HEART VALVE REPLACED: ICD-10-CM

## 2025-08-12 DIAGNOSIS — Z95.2 HEART VALVE REPLACED BY TRANSPLANT: Primary | ICD-10-CM

## 2025-08-12 LAB
INR PPP: 2.6
PROTHROMBIN TIME: 27.6 SECONDS (ref 11.7–14.5)

## 2025-08-12 PROCEDURE — 36415 COLL VENOUS BLD VENIPUNCTURE: CPT

## 2025-08-12 PROCEDURE — 85610 PROTHROMBIN TIME: CPT

## 2025-08-25 ENCOUNTER — LAB VISIT (OUTPATIENT)
Dept: LAB | Facility: HOSPITAL | Age: 30
End: 2025-08-25
Attending: INTERNAL MEDICINE
Payer: COMMERCIAL

## 2025-08-25 DIAGNOSIS — Z95.2 HEART VALVE REPLACED BY TRANSPLANT: Primary | ICD-10-CM

## 2025-08-25 DIAGNOSIS — Z91.018 FOOD ALLERGY: ICD-10-CM

## 2025-08-25 DIAGNOSIS — Z01.84 IMMUNITY STATUS TESTING: ICD-10-CM

## 2025-08-25 DIAGNOSIS — Z95.2 HEART VALVE REPLACED: ICD-10-CM

## 2025-08-25 LAB
INR PPP: 1.4
PROTHROMBIN TIME: 17.6 SECONDS (ref 11.7–14.5)

## 2025-08-25 PROCEDURE — 36415 COLL VENOUS BLD VENIPUNCTURE: CPT

## 2025-08-25 PROCEDURE — 85610 PROTHROMBIN TIME: CPT
